# Patient Record
Sex: MALE | URBAN - METROPOLITAN AREA
[De-identification: names, ages, dates, MRNs, and addresses within clinical notes are randomized per-mention and may not be internally consistent; named-entity substitution may affect disease eponyms.]

---

## 2018-10-22 ENCOUNTER — OFFICE VISIT (OUTPATIENT)
Dept: URBAN - METROPOLITAN AREA CLINIC 11 | Facility: CLINIC | Age: 33
End: 2018-10-22
Payer: COMMERCIAL

## 2018-10-22 DIAGNOSIS — H18.212 CORNEAL EDEMA SECONDARY TO CONTACT LENS, LEFT EYE: Primary | ICD-10-CM

## 2018-10-22 PROCEDURE — 92012 INTRM OPH EXAM EST PATIENT: CPT | Performed by: OPTOMETRIST

## 2018-10-22 NOTE — IMPRESSION/PLAN
Impression: Corneal edema secondary to contact lens, left eye: H18.212. Plan: reduce OFlox to 1gt qid os and increase PF to tid os. f/u 1wk.  no CL wear until off drops

## 2019-05-23 ENCOUNTER — TELEPHONE (OUTPATIENT)
Dept: ORTHOPEDICS | Facility: CLINIC | Age: 34
End: 2019-05-23

## 2019-05-23 DIAGNOSIS — M25.552 BILATERAL HIP PAIN: Primary | ICD-10-CM

## 2019-05-23 DIAGNOSIS — M25.551 BILATERAL HIP PAIN: Primary | ICD-10-CM

## 2019-05-23 NOTE — TELEPHONE ENCOUNTER
Returned call to patient. Appointment scheduled with Dr. Rico. Patient voiced understanding of appointment date, time, and location.

## 2019-05-23 NOTE — TELEPHONE ENCOUNTER
----- Message from Shaniqua Johnson sent at 5/23/2019 10:48 AM CDT -----  Type: Needs Medical Advice    Who Called:  Patient   Best Call Back Number: [phone]  Additional Information: contact patient to advise if a referral was received from [facility]

## 2019-05-31 ENCOUNTER — HOSPITAL ENCOUNTER (OUTPATIENT)
Dept: RADIOLOGY | Facility: HOSPITAL | Age: 34
Discharge: HOME | End: 2019-05-31
Attending: ORTHOPAEDIC SURGERY
Payer: OTHER GOVERNMENT

## 2019-05-31 ENCOUNTER — OFFICE VISIT (OUTPATIENT)
Dept: ORTHOPEDICS | Facility: CLINIC | Age: 34
End: 2019-05-31
Payer: OTHER GOVERNMENT

## 2019-05-31 ENCOUNTER — TELEPHONE (OUTPATIENT)
Dept: ORTHOPEDICS | Facility: CLINIC | Age: 34
End: 2019-05-31

## 2019-05-31 VITALS
BODY MASS INDEX: 34.8 KG/M2 | SYSTOLIC BLOOD PRESSURE: 119 MMHG | HEART RATE: 74 BPM | DIASTOLIC BLOOD PRESSURE: 84 MMHG | RESPIRATION RATE: 18 BRPM | WEIGHT: 235 LBS | HEIGHT: 69 IN

## 2019-05-31 DIAGNOSIS — S73.192A TEAR OF LEFT ACETABULAR LABRUM, INITIAL ENCOUNTER: Primary | ICD-10-CM

## 2019-05-31 DIAGNOSIS — M70.61 GREATER TROCHANTERIC BURSITIS OF BOTH HIPS: ICD-10-CM

## 2019-05-31 DIAGNOSIS — S73.191A TEAR OF RIGHT ACETABULAR LABRUM, INITIAL ENCOUNTER: Primary | ICD-10-CM

## 2019-05-31 DIAGNOSIS — M25.552 BILATERAL HIP PAIN: ICD-10-CM

## 2019-05-31 DIAGNOSIS — M70.62 GREATER TROCHANTERIC BURSITIS OF BOTH HIPS: ICD-10-CM

## 2019-05-31 DIAGNOSIS — M47.817 DJD (DEGENERATIVE JOINT DISEASE), LUMBOSACRAL: Primary | ICD-10-CM

## 2019-05-31 DIAGNOSIS — M19.90 ARTHRITIS: ICD-10-CM

## 2019-05-31 DIAGNOSIS — M25.551 BILATERAL HIP PAIN: ICD-10-CM

## 2019-05-31 DIAGNOSIS — M16.11 ARTHRITIS OF RIGHT HIP: ICD-10-CM

## 2019-05-31 DIAGNOSIS — M54.10 RADICULAR PAIN OF LEFT LOWER EXTREMITY: ICD-10-CM

## 2019-05-31 DIAGNOSIS — S73.199A TEAR OF ACETABULAR LABRUM, UNSPECIFIED LATERALITY, INITIAL ENCOUNTER: ICD-10-CM

## 2019-05-31 DIAGNOSIS — M25.552 LEFT HIP PAIN: ICD-10-CM

## 2019-05-31 DIAGNOSIS — M25.551 RIGHT HIP PAIN: ICD-10-CM

## 2019-05-31 PROCEDURE — 99999 PR PBB SHADOW E&M-EST. PATIENT-LVL III: ICD-10-PCS | Mod: PBBFAC,,, | Performed by: ORTHOPAEDIC SURGERY

## 2019-05-31 PROCEDURE — 99213 OFFICE O/P EST LOW 20 MIN: CPT | Mod: PBBFAC,25,PN | Performed by: ORTHOPAEDIC SURGERY

## 2019-05-31 PROCEDURE — 73521 X-RAY EXAM HIPS BI 2 VIEWS: CPT | Mod: TC,PN

## 2019-05-31 PROCEDURE — 99204 OFFICE O/P NEW MOD 45 MIN: CPT | Mod: S$PBB,,, | Performed by: ORTHOPAEDIC SURGERY

## 2019-05-31 PROCEDURE — 73521 XR HIPS BILATERAL 2 VIEW INCL AP PELVIS: ICD-10-PCS | Mod: 26,,, | Performed by: RADIOLOGY

## 2019-05-31 PROCEDURE — 99999 PR PBB SHADOW E&M-EST. PATIENT-LVL III: CPT | Mod: PBBFAC,,, | Performed by: ORTHOPAEDIC SURGERY

## 2019-05-31 PROCEDURE — 73521 X-RAY EXAM HIPS BI 2 VIEWS: CPT | Mod: 26,,, | Performed by: RADIOLOGY

## 2019-05-31 PROCEDURE — 99204 PR OFFICE/OUTPT VISIT, NEW, LEVL IV, 45-59 MIN: ICD-10-PCS | Mod: S$PBB,,, | Performed by: ORTHOPAEDIC SURGERY

## 2019-05-31 RX ORDER — TIZANIDINE 4 MG/1
4 TABLET ORAL EVERY 6 HOURS PRN
COMMUNITY

## 2019-05-31 RX ORDER — CHOLECALCIFEROL (VITAMIN D3) 25 MCG
50000 TABLET ORAL
COMMUNITY

## 2019-05-31 RX ORDER — DULOXETIN HYDROCHLORIDE 60 MG/1
60 CAPSULE, DELAYED RELEASE ORAL 2 TIMES DAILY
COMMUNITY

## 2019-05-31 RX ORDER — BUSPIRONE HYDROCHLORIDE 15 MG/1
15 TABLET ORAL 2 TIMES DAILY
COMMUNITY
End: 2020-04-08

## 2019-05-31 RX ORDER — GABAPENTIN 100 MG/1
100 CAPSULE ORAL 2 TIMES DAILY
COMMUNITY
End: 2020-07-30

## 2019-05-31 RX ORDER — IBUPROFEN 800 MG/1
800 TABLET ORAL EVERY 6 HOURS PRN
COMMUNITY
End: 2020-12-01 | Stop reason: ALTCHOICE

## 2019-05-31 RX ORDER — DOXEPIN HYDROCHLORIDE 10 MG/1
10 CAPSULE ORAL NIGHTLY
COMMUNITY
End: 2020-07-30

## 2019-05-31 RX ORDER — HYDROCORTISONE 25 MG/G
CREAM TOPICAL 2 TIMES DAILY
COMMUNITY

## 2019-05-31 RX ORDER — OMEPRAZOLE 20 MG/1
20 CAPSULE, DELAYED RELEASE ORAL DAILY
COMMUNITY
End: 2020-04-08 | Stop reason: SDUPTHER

## 2019-05-31 RX ORDER — POLYETHYLENE GLYCOL 3350 17 G/17G
17 POWDER, FOR SOLUTION ORAL
COMMUNITY
End: 2021-01-08 | Stop reason: SDUPTHER

## 2019-05-31 NOTE — PROGRESS NOTES
Subjective:      Patient ID: Primo Yeh is a 33 y.o. male.    Chief Complaint: Pain and Injury of the Left Hip and Pain and Injury of the Right Hip    Referring Provider: Aaareferral Self  No address on file    HPI:  Mr. Yeh is a 33-year-old gentleman who presented today for evaluation of greater than 5 years of bilateral hip pain.  He stated that his pain began after he did unstable landing while in jumped school with the [employer].  Side to side motion increases his symptoms while walking also increases them. Stretching his hips helps to decrease some of the pain.  His left hip is more painful than his right.  He also has a history of radiculopathy down the left side of his leg. He has taken NSAIDs without help.  He has not done physical therapy nor had injections.    Past Medical History:   Diagnosis Date    Anxiety     Arthritis     Depression      *  *  *  *  *  *  *  * Fractures  Asthma  Seasonal allergies  GERD  IBS  Headaches  Sleep apnea  PTSD  Pain management      Past Surgical History:   Procedure Laterality Date    PERONEAL TENDON REPAIR LEFT ANKLE Left     COLONOSCOPY      EYE SURGERY PRK bilaterally  2013     * WRIST FRACTURE SURGERY ORIF left wrist  EGD Left 2005       Review of patient's allergies indicates:  No Known Allergies    Social History     Occupational History         Tobacco Use    Smoking status: Former Smoker   Substance and Sexual Activity    Alcohol use: Yes    Drug use: Never    Sexual activity: Not on file      Family History   Problem Relation Age of Onset    Diabetes Mother     Cancer Father     Lupus Brother     Hypertension Brother     Asthma Son     Sleep apnea Son     Allergies Son     Allergies Daughter     Allergies Daughter        Previous Hospitalizations:  Denied previous hospitalizations.    ROS:   Review of Systems   Constitution: Negative for chills and fever.   HENT: Negative for congestion.    Eyes: Negative for blurred vision.    Cardiovascular: Negative for chest pain.   Respiratory: Negative for cough.    Endocrine: Negative for polydipsia.   Hematologic/Lymphatic: Negative for adenopathy.   Skin: Negative for itching and rash.   Musculoskeletal: Positive for joint pain. Negative for gout.   Gastrointestinal: Positive for heartburn. Negative for constipation and diarrhea.   Genitourinary: Negative for nocturia.   Neurological: Positive for headaches. Negative for seizures.   Psychiatric/Behavioral: Positive for depression. Negative for substance abuse. The patient is nervous/anxious.    Allergic/Immunologic: Positive for environmental allergies.           Objective:      Physical Exam:   General: AAOx3.  No acute distress  HEENT: Normocephalic, PEARLA EOMI, Good Dentition  Neck: Supple, No JVD  Chest: Symetric, equal excursion on inspiration  Abdomen: Soft NTND  Vascular:  Pulses intact and equal bilaterally.  Capillary refill less than 3 seconds and equal bilaterally  Neurologic:  Pinprick and soft touch intact and equal bilaterally .  Mildly positive SLR left lower extremity  Integment:  No ecchymosis, no errythema  Extremity:  Hip:  Flexion/extension equal bilaterally greater than 95/15 degrees. Internal/external rotation equal bilaterally.  Gilberto/fabere/logroll/push-pull relatively negative both hips.  Positive CAM test both hips.  Tender over both greater trochanters. Charly's positive bilaterally. Mild tenderness with groin palpation.                      Lumbosacral spine:  Forward flexion/backward flexion 65/15 degrees. Right/left rotation 45/45 degrees. Right/left side bending 25/25 degrees. Nontender with palpation lumbosacral spine. Relatively nontender with motion lumbosacral spine.  Radiography:  Personally reviewed x-rays which include AP pelvis and bilateral hips completed on 05/31/2019 showed moderate osteoarthritic changes of both hips with the right greater than left with the right with early osteophytes.  There is also  an osteophyte at off the left greater trochanter. Lumbosacral degenerative changes are also seen.      Assessment:       Impression:      1. Tear of acetabular labrum, unspecified laterality, initial encounter    2. Arthritis of right hip    3.  4.  5. Greater trochanteric bursitis of both hips   Lumbosacral arthritis.  Mild radicular left lower extremity pain         Plan:       1.  Discussed physical examination and radiographic findings with the patient. Primo understands that he has what appears to be a labral tear and some mild arthritis of his hips.  This is exacerbated by some mild radicular issues.  He understands in order to fully evaluate his hips he will need an MRI.  2.  Refer for an MRI of both hips.  3.  Final recommendations after MRI is completed.  4.  Take NSAIDs as tolerated allowed by PCM.  5.  Home exercises to include hip stretching exercises were discussed.  6.  Offered a steroid injection or to the greater trochanters of both hips he declined.  7.  [facility] portal was discussed with the patient and information was given.  The patient was encouraged to use the portal for future encounters.  8.  Follow up after MRI is completed.  Discussed with the patient that if the MRIs show a labral tear he will need to be referred to an orthopedic surgeon who performs hip arthroscopy surgery.

## 2019-06-11 ENCOUNTER — HOSPITAL ENCOUNTER (OUTPATIENT)
Dept: RADIOLOGY | Facility: HOSPITAL | Age: 34
Discharge: HOME | End: 2019-06-11
Attending: ORTHOPAEDIC SURGERY
Payer: OTHER GOVERNMENT

## 2019-06-11 DIAGNOSIS — M19.90 ARTHRITIS: ICD-10-CM

## 2019-06-11 DIAGNOSIS — M25.552 LEFT HIP PAIN: ICD-10-CM

## 2019-06-11 DIAGNOSIS — S73.192A TEAR OF LEFT ACETABULAR LABRUM, INITIAL ENCOUNTER: ICD-10-CM

## 2019-06-11 DIAGNOSIS — S73.191A TEAR OF RIGHT ACETABULAR LABRUM, INITIAL ENCOUNTER: ICD-10-CM

## 2019-06-11 DIAGNOSIS — M25.551 RIGHT HIP PAIN: ICD-10-CM

## 2019-06-11 PROCEDURE — 73721 MRI HIP WITHOUT CONTRAST RIGHT: ICD-10-PCS | Mod: 26,RT,, | Performed by: RADIOLOGY

## 2019-06-11 PROCEDURE — 73721 MRI JNT OF LWR EXTRE W/O DYE: CPT | Mod: 26,RT,, | Performed by: RADIOLOGY

## 2019-06-11 PROCEDURE — 73721 MRI JNT OF LWR EXTRE W/O DYE: CPT | Mod: TC,LT

## 2019-06-11 PROCEDURE — 73721 MRI HIP WITHOUT CONTRAST LEFT: ICD-10-PCS | Mod: 26,LT,, | Performed by: RADIOLOGY

## 2019-06-11 PROCEDURE — 73721 MRI JNT OF LWR EXTRE W/O DYE: CPT | Mod: TC,RT

## 2019-06-11 PROCEDURE — 73721 MRI JNT OF LWR EXTRE W/O DYE: CPT | Mod: 26,LT,, | Performed by: RADIOLOGY

## 2019-07-15 ENCOUNTER — TELEPHONE (OUTPATIENT)
Dept: ORTHOPEDICS | Facility: CLINIC | Age: 34
End: 2019-07-15

## 2019-07-15 NOTE — TELEPHONE ENCOUNTER
Returned the patient's call. The patient was calling to schedule a follow up appointment to review his MRI results of his hips. Patient is scheduled for this Wednesday, 7-, in [place]. Patient verbalized date, time, and location of appointment.

## 2019-07-15 NOTE — TELEPHONE ENCOUNTER
----- Message from RT Shant sent at 7/15/2019 10:29 AM CDT -----  Contact: pt    pt , requesting a call back soon, seeking medical advise, thanks.

## 2019-07-17 ENCOUNTER — OFFICE VISIT (OUTPATIENT)
Dept: ORTHOPEDICS | Facility: CLINIC | Age: 34
End: 2019-07-17
Payer: OTHER GOVERNMENT

## 2019-07-17 VITALS
WEIGHT: 235 LBS | SYSTOLIC BLOOD PRESSURE: 123 MMHG | HEIGHT: 69 IN | HEART RATE: 78 BPM | DIASTOLIC BLOOD PRESSURE: 85 MMHG | BODY MASS INDEX: 34.8 KG/M2

## 2019-07-17 DIAGNOSIS — S73.199D TEAR OF ACETABULAR LABRUM, UNSPECIFIED LATERALITY, SUBSEQUENT ENCOUNTER: Primary | ICD-10-CM

## 2019-07-17 DIAGNOSIS — M16.0 PRIMARY OSTEOARTHRITIS OF BOTH HIPS: ICD-10-CM

## 2019-07-17 PROCEDURE — 99213 OFFICE O/P EST LOW 20 MIN: CPT | Mod: S$PBB,,, | Performed by: ORTHOPAEDIC SURGERY

## 2019-07-17 PROCEDURE — 99213 OFFICE O/P EST LOW 20 MIN: CPT | Mod: PBBFAC,PN | Performed by: ORTHOPAEDIC SURGERY

## 2019-07-17 PROCEDURE — 99999 PR PBB SHADOW E&M-EST. PATIENT-LVL III: CPT | Mod: PBBFAC,,, | Performed by: ORTHOPAEDIC SURGERY

## 2019-07-17 PROCEDURE — 99999 PR PBB SHADOW E&M-EST. PATIENT-LVL III: ICD-10-PCS | Mod: PBBFAC,,, | Performed by: ORTHOPAEDIC SURGERY

## 2019-07-17 PROCEDURE — 99213 PR OFFICE/OUTPT VISIT, EST, LEVL III, 20-29 MIN: ICD-10-PCS | Mod: S$PBB,,, | Performed by: ORTHOPAEDIC SURGERY

## 2019-07-17 NOTE — PROGRESS NOTES
Subjective:      Patient ID: Primo Yeh is a 33 y.o. male.    Chief Complaint: Pain of the Left Hip; Pain of the Right Hip; and Results (MRI Bilateral Hips)      HPI: Mr. Yeh returns today to review the results of an MRI of his hips.  He was last seen on 05/31/2019 for 5 years of bilateral hip pain with the left worse than the right which began after he landed funny during a pair shoe jump.  He stated his left leg is still more symptomatic than his right.    ROS:  No new diagnosis/surgery/prescriptions since last office visit on 05/31/2019.  Constitution: Negative for chills and fever.   HENT: Negative for congestion.    Eyes: Negative for blurred vision.   Cardiovascular: Negative for chest pain.   Respiratory: Negative for cough.    Endocrine: Negative for polydipsia.   Hematologic/Lymphatic: Negative for adenopathy.   Skin: Negative for itching and rash.   Musculoskeletal: Positive for joint pain. Negative for gout.   Gastrointestinal: Positive for heartburn. Negative for constipation and diarrhea.   Genitourinary: Negative for nocturia.   Neurological: Positive for headaches. Negative for seizures.   Psychiatric/Behavioral: Positive for depression. Negative for substance abuse. The patient is nervous/anxious.    Allergic/Immunologic: Positive for environmental allergies.       Objective:      Physical Exam:   General: AAOx3.  No acute distress  Vascular:  Pulses intact and equal bilaterally.  Capillary refill less than 3 seconds and equal bilaterally  Neurologic:  Pinprick and soft touch intact and equal bilaterally.  Mildl positive SLR  Integment:  No ecchymosis, no errythema  Extremity:  Hip:  Flexion/extension equal bilaterally greater than 95/15 degrees. Internal/external rotation equal bilaterally.  Gilberto/fabere/logroll/push-pull relatively negative both hips.  Positive CAM test both hips.  Tender over both greater trochanters. Charly's positive bilaterally. Mild tenderness with groin  palpation.  Radiography:  Personally reviewed MRI of both hips completed on 06/11/2019 which showed femoral acetabular early arthritis of both hips and a superior labral tear of his right hip with the subtle labral changes in his left hip. He also has pubic symphysis arthritis.      Assessment:       Impression:   1.  Labral tear, right hip.  2.  Labral tear, left hip.  3.  Mild femoral acetabular arthritis, both hips.      Plan:       1.  Discussed physical examination and radiographic findings with the patient. Primo understands that he has labral tears of his hips and unfortunately this office does not perform hip arthroscopy.  He will need to be seen by hip arthroscopy past.  2.  Refer to orthopedic hip arthroscopy.  3.  Any pain can be treated with over-the-counter medications dosed per box instructions.  4.  Continue with hip exercises which were shown at last visit.  5.  [facility] portal was discussed with the patient and information was given.  The patient was encouraged to use the portal for future encounters.

## 2019-07-23 ENCOUNTER — TELEPHONE (OUTPATIENT)
Dept: ORTHOPEDICS | Facility: CLINIC | Age: 34
End: 2019-07-23

## 2019-07-23 NOTE — TELEPHONE ENCOUNTER
Returned the patient's call concerning the medical documents. Patient was requesting his office visit notes be sent to his primary care physician. I advised that he would need to fill out a release of information paper with his signature before those notes could be released. The patient understood and stated he would fill out the paper and have it put in his chart.   Both parties advised understanding.

## 2019-07-23 NOTE — TELEPHONE ENCOUNTER
----- Message from Zee Vaughn sent at 7/23/2019  3:26 PM CDT -----  Type: Needs Medical Advice    Who Called:  Patient  Best Call Back Number: [phone] (home)     Additional Information: Would like to speak to office concerning his medical documents. Please call for more details.

## 2019-08-21 ENCOUNTER — TELEPHONE (OUTPATIENT)
Dept: ORTHOPEDICS | Facility: CLINIC | Age: 34
End: 2019-08-21

## 2019-08-21 NOTE — TELEPHONE ENCOUNTER
----- Message from Carlos Patel sent at 8/21/2019  9:38 AM CDT -----  Contact: pt  Needs info regarding appointment, [phone]

## 2020-04-07 ENCOUNTER — TELEPHONE (OUTPATIENT)
Dept: GASTROENTEROLOGY | Facility: CLINIC | Age: 35
End: 2020-04-07

## 2020-04-07 NOTE — TELEPHONE ENCOUNTER
----- Message from Tanmay Dhaliwal sent at 4/7/2020 11:56 AM CDT -----  Type:  Patient Returning Call    Who Called:  Patient  Who Left Message for Patient:  Farheen  Does the patient know what this is regarding?:    Best Call Back Number:  [phone]  Additional Information:

## 2020-04-07 NOTE — TELEPHONE ENCOUNTER
----- Message from Eliza Bauer sent at 4/7/2020 11:45 AM CDT -----  Contact: Patient  Type: Needs Medical Advice    Who Called:  Patient  Best Call Back Number: [phone] (home)   Additional Information: The pt said he is having abd pain from IBS and feels he needs to be seen asap please call to get him an appt patient said he is open to a Virtual Video appt

## 2020-04-08 ENCOUNTER — OFFICE VISIT (OUTPATIENT)
Dept: GASTROENTEROLOGY | Facility: CLINIC | Age: 35
End: 2020-04-08
Payer: OTHER GOVERNMENT

## 2020-04-08 ENCOUNTER — HOSPITAL ENCOUNTER (OUTPATIENT)
Dept: RADIOLOGY | Facility: HOSPITAL | Age: 35
Discharge: HOME | End: 2020-04-08
Attending: INTERNAL MEDICINE
Payer: OTHER GOVERNMENT

## 2020-04-08 VITALS
HEIGHT: 69 IN | RESPIRATION RATE: 18 BRPM | WEIGHT: 240 LBS | SYSTOLIC BLOOD PRESSURE: 120 MMHG | OXYGEN SATURATION: 97 % | DIASTOLIC BLOOD PRESSURE: 84 MMHG | HEART RATE: 80 BPM | BODY MASS INDEX: 35.55 KG/M2

## 2020-04-08 DIAGNOSIS — K58.9 IRRITABLE BOWEL SYNDROME, UNSPECIFIED TYPE: Primary | ICD-10-CM

## 2020-04-08 DIAGNOSIS — R19.7 DIARRHEA, UNSPECIFIED TYPE: ICD-10-CM

## 2020-04-08 DIAGNOSIS — K58.9 IRRITABLE BOWEL SYNDROME, UNSPECIFIED TYPE: ICD-10-CM

## 2020-04-08 DIAGNOSIS — K64.9 HEMORRHOIDS, UNSPECIFIED HEMORRHOID TYPE: ICD-10-CM

## 2020-04-08 DIAGNOSIS — K21.9 GASTROESOPHAGEAL REFLUX DISEASE, ESOPHAGITIS PRESENCE NOT SPECIFIED: ICD-10-CM

## 2020-04-08 PROCEDURE — 76700 US EXAM ABDOM COMPLETE: CPT | Mod: 26,,, | Performed by: RADIOLOGY

## 2020-04-08 PROCEDURE — 76700 US ABDOMEN COMPLETE: ICD-10-PCS | Mod: 26,,, | Performed by: RADIOLOGY

## 2020-04-08 PROCEDURE — 99204 OFFICE O/P NEW MOD 45 MIN: CPT | Mod: S$PBB,,, | Performed by: INTERNAL MEDICINE

## 2020-04-08 PROCEDURE — 99204 PR OFFICE/OUTPT VISIT, NEW, LEVL IV, 45-59 MIN: ICD-10-PCS | Mod: S$PBB,,, | Performed by: INTERNAL MEDICINE

## 2020-04-08 PROCEDURE — 99999 PR PBB SHADOW E&M-EST. PATIENT-LVL III: CPT | Mod: PBBFAC,,, | Performed by: INTERNAL MEDICINE

## 2020-04-08 PROCEDURE — 99999 PR PBB SHADOW E&M-EST. PATIENT-LVL III: ICD-10-PCS | Mod: PBBFAC,,, | Performed by: INTERNAL MEDICINE

## 2020-04-08 PROCEDURE — 76700 US EXAM ABDOM COMPLETE: CPT | Mod: TC,PN

## 2020-04-08 PROCEDURE — 99213 OFFICE O/P EST LOW 20 MIN: CPT | Mod: PBBFAC,PN,25 | Performed by: INTERNAL MEDICINE

## 2020-04-08 RX ORDER — QUETIAPINE FUMARATE 50 MG/1
TABLET, FILM COATED ORAL
COMMUNITY
Start: 2020-01-21

## 2020-04-08 RX ORDER — AMITRIPTYLINE HYDROCHLORIDE 10 MG/1
TABLET, FILM COATED ORAL
COMMUNITY
Start: 2020-04-03 | End: 2020-07-30

## 2020-04-08 RX ORDER — DICYCLOMINE HYDROCHLORIDE 20 MG/1
20 TABLET ORAL
Qty: 360 TABLET | Refills: 1 | Status: SHIPPED | OUTPATIENT
Start: 2020-04-08 | End: 2020-10-05

## 2020-04-08 RX ORDER — OMEPRAZOLE 20 MG/1
20 CAPSULE, DELAYED RELEASE ORAL DAILY
Qty: 90 CAPSULE | Refills: 1 | Status: SHIPPED | OUTPATIENT
Start: 2020-04-08 | End: 2020-04-29

## 2020-04-08 NOTE — PATIENT INSTRUCTIONS
He will continue ibuprofen but take his omeprazole daily basis.  He started on the dicyclomine for the spasm.  He will make a food diary and avoid the off ending foods.  He had more fiber to the diet.  Stool studies labs and ultrasound be scheduled.  I have requested his previous records.

## 2020-04-08 NOTE — PROGRESS NOTES
"Subjective:       Patient ID: Primo Yeh is a 34 y.o. male.    Chief Complaint: Irritable Bowel Syndrome    He complains of abdominal pain.  He states from my knees to my chest I have chronic pain ".  He states in 2007 while in [employer] he experienced abdominal pain in the left lower quadrant associated bowel irregularity he has been having diarrhea constipation.  He describes upper endoscopy and told he had a hiatal hernia gastroesophageal reflux and he has been on omeprazole intermittently since then.  He was told that he had an irritable bowel.  He had a colonoscopy because his father had colon cancer at age 47.  He was told he had internal hemorrhoids.  He has episodes of severe diarrhea.  He was tried on multiple medications including the vibrizi which helped his symptoms although it aggravated his migraine headaches would was stopped.  He cannot pinpoint a a precipitating factor for his diarrhea.  He has 2-3 days of diarrhea and then experiences constipation.  Currently he is on multiple OTC remedies for the hemorrhoids.  He complains of pain rather than hematochezia.  He denies hematemesis hematochezia jaundice dysphagia or aspiration.  He denies fever chills.  His weight has remained stable.  He has tried the hydrocortisone suppositories which has helped him.  At one  time he was taking 4-5 is suppositories per day.  He takes frequent showers because of the diarrhea and perianal contamination due to the liquid bowel movements.  His family history is negative for GI disease except the father had colon cancer.  [facility] he states was normal.  He also was evaluated at [facility].  He states the pain has been chronic.  He states that he was a [employer] tro[employer] and after a jump landed on his tailbone.  He believes this is the precipitating factor for his symptoms.  He has chronic back and hip and joint pain.  He has been followed by the orthopedist.  He states the " NIKI lucero row friend has helped the symptoms.  He denies swollen joints.  He states [facility] is disabled [facility].  He has not worked in 2 years.      Allergies:  Review of patient's allergies indicates:  No Known Allergies    Medications:    Current Outpatient Medications:     amitriptyline (ELAVIL) 10 MG tablet, , Disp: , Rfl:     doxepin (SINEQUAN) 10 MG capsule, Take 10 mg by mouth every evening., Disp: , Rfl:     DULoxetine (CYMBALTA) 60 MG capsule, Take 60 mg by mouth 2 (two) times daily., Disp: , Rfl:     gabapentin (NEURONTIN) 100 MG capsule, Take 100 mg by mouth 2 (two) times daily., Disp: , Rfl:     hydrocortisone (ANUSOL-HC) 2.5 % rectal cream, Place rectally 2 (two) times daily., Disp: , Rfl:     hydrocortisone (PROCTOCORT) 10 % (80 mg) rectal foam, Place 1 applicator rectally 2 (two) times daily., Disp: , Rfl:     hydrocortisone-pramoxine (PROCTOFOAM-HS) rectal foam, hydrocortisone 1 %-pramoxine 1 % rectal foam  Insert by rectal route., Disp: , Rfl:     ibuprofen (ADVIL,MOTRIN) 800 MG tablet, Take 800 mg by mouth every 6 (six) hours as needed for Pain., Disp: , Rfl:     omeprazole (PRILOSEC) 20 MG capsule, Take 1 capsule (20 mg total) by mouth once daily., Disp: 90 capsule, Rfl: 1    polycarbophil (FIBERCON) 625 mg tablet, Take 625 mg by mouth once daily., Disp: , Rfl:     polyethylene glycol (MIRALAX) 17 gram/dose powder, Take 17 g by mouth as needed., Disp: , Rfl:     QUEtiapine (SEROQUEL) 50 MG tablet, , Disp: , Rfl:     tiZANidine (ZANAFLEX) 4 MG tablet, Take 4 mg by mouth every 6 (six) hours as needed., Disp: , Rfl:     vitamin D (VITAMIN D3) 1000 units Tab, Take 1,000 Units by mouth 2 (two) times daily., Disp: , Rfl:     dicyclomine (BENTYL) 20 mg tablet, Take 1 tablet (20 mg total) by mouth 4 (four) times daily before meals and nightly., Disp: 360 tablet, Rfl: 1    Past Medical History:   Diagnosis Date    Anxiety     Arthritis     Depression     Fractures        Past Surgical  History:   Procedure Laterality Date    ANKLE FRACTURE SURGERY Left     COLONOSCOPY      EYE SURGERY  2013    WRIST FRACTURE SURGERY Left 2005         Review of Systems   Gastrointestinal: Positive for abdominal pain, constipation, diarrhea and nausea. Negative for abdominal distention, anal bleeding, blood in stool, rectal pain and vomiting.        He has been tried on multiple medications including fiber supplements which did not help him.  He has been tried on the busp(ar  and similar agents which has not helped his anxiety or his pain complex.  He mainly has diarrhea with left lower quadrant discomfort.  The cramping left lower quadrant signals the diarrhea.  It is not associated with incontinence and is not nocturnal.  He cannot pinpoint a specific food her factor which causes the bowel irregularity.  Is not related to physical activity.   Musculoskeletal: Positive for arthralgias and back pain.   Neurological:        He states his chronic migraine headaches although he has not had headache recently.       Objective:      Physical Exam   Constitutional: He is oriented to person, place, and time. He appears well-developed and well-nourished.   Well-nourished well-hydrated overweight nonicteric  male.  He is oriented x3.  He can relate his history and answer questions appropriately.  The patient in the exam in physician both will ran mask and gloves.   HENT:   Head: Normocephalic.   Eyes: Pupils are equal, round, and reactive to light. EOM are normal.   Neck: Normal range of motion. Neck supple. No tracheal deviation present. No thyromegaly present.   Cardiovascular: Normal rate, regular rhythm and normal heart sounds.   Pulmonary/Chest: Effort normal and breath sounds normal.   Abdominal: Soft. Bowel sounds are normal. He exhibits no distension and no mass. There is tenderness. There is no rebound and no guarding. A hernia is present.   The abdomen is soft and there is a small umbilical  hernia which is reducible.  It is obese with minimal tenderness in left lower quadrant.  Organomegaly masses are absent.  Bowel sounds are normal.   Musculoskeletal: Normal range of motion.   He can ambulate normally.  He can go from the sitting to the standing position without difficulty.  He can get on the exam table without difficulty or assistance.   Lymphadenopathy:     He has no cervical adenopathy.   Neurological: He is alert and oriented to person, place, and time. No cranial nerve deficit.   Skin: Skin is warm and dry.   Psychiatric: He has a normal mood and affect. His behavior is normal.   Vitals reviewed.        Plan:       Irritable bowel syndrome, unspecified type  -     Calprotectin; Future; Expected date: 04/08/2020  -     H. pylori antigen, stool; Future; Expected date: 04/08/2020  -     C-reactive protein; Future; Expected date: 04/08/2020  -     Gastrointestinal Pathogens Panel, PCR; Future; Expected date: 04/08/2020  -     CBC auto differential; Future; Expected date: 04/08/2020  -     Comprehensive metabolic panel; Future; Expected date: 04/08/2020  -     Strongyloides IgG Antibodies; Future; Expected date: 04/08/2020  -     US Abdomen Complete; Future; Expected date: 04/08/2020  -     dicyclomine (BENTYL) 20 mg tablet; Take 1 tablet (20 mg total) by mouth 4 (four) times daily before meals and nightly.  Dispense: 360 tablet; Refill: 1    Diarrhea, unspecified type  -     Calprotectin; Future; Expected date: 04/08/2020  -     H. pylori antigen, stool; Future; Expected date: 04/08/2020  -     C-reactive protein; Future; Expected date: 04/08/2020  -     Gastrointestinal Pathogens Panel, PCR; Future; Expected date: 04/08/2020  -     CBC auto differential; Future; Expected date: 04/08/2020  -     Comprehensive metabolic panel; Future; Expected date: 04/08/2020  -     Strongyloides IgG Antibodies; Future; Expected date: 04/08/2020  -     US Abdomen Complete; Future; Expected date:  04/08/2020    Gastroesophageal reflux disease, esophagitis presence not specified  -     omeprazole (PRILOSEC) 20 MG capsule; Take 1 capsule (20 mg total) by mouth once daily.  Dispense: 90 capsule; Refill: 1    Hemorrhoids, unspecified hemorrhoid type     He will continue his reflux regimen.  He will take his omeprazole daily.  He continue the ibuprofen and current medications.  He will make a food diary and avoid the offending foods.  I have requested the previous records.  Laboratory evaluation is in progress.

## 2020-04-20 ENCOUNTER — LAB VISIT (OUTPATIENT)
Dept: LAB | Facility: HOSPITAL | Age: 35
End: 2020-04-20
Attending: INTERNAL MEDICINE
Payer: OTHER GOVERNMENT

## 2020-04-20 DIAGNOSIS — R19.7 DIARRHEA, UNSPECIFIED TYPE: ICD-10-CM

## 2020-04-20 DIAGNOSIS — K58.9 IRRITABLE BOWEL SYNDROME, UNSPECIFIED TYPE: ICD-10-CM

## 2020-04-20 PROCEDURE — 87338 HPYLORI STOOL AG IA: CPT

## 2020-04-20 PROCEDURE — 87507 IADNA-DNA/RNA PROBE TQ 12-25: CPT

## 2020-04-20 PROCEDURE — 83993 ASSAY FOR CALPROTECTIN FECAL: CPT

## 2020-04-23 LAB
CALPROTECTIN STL-MCNT: <27.1 MCG/G
GPP - ADENOVIRUS 40/41: NOT DETECTED
GPP - CAMPYLOBACTER: NOT DETECTED
GPP - CLOSTRIDIUM DIFFICILE TOXIN A/B: NOT DETECTED
GPP - CRYPTOSPORIDIUM: NOT DETECTED
GPP - E COLI O157: NOT DETECTED
GPP - ENTAMOEBA HISTOLYTICA: NOT DETECTED
GPP - ENTEROTOXIGENIC E COLI (ETEC): NOT DETECTED
GPP - GIARDIA LAMBLIA: NOT DETECTED
GPP - NOROVIRUS GI/GII: NOT DETECTED
GPP - ROTAVIRUS A: NOT DETECTED
GPP - SALMONELLA: NOT DETECTED
GPP - SHIGELLA: NOT DETECTED
GPP - VIBRIO CHOLERA: NOT DETECTED
GPP - YERSINIA ENTEROCOLITICA: NOT DETECTED
LACTATE PLASV-SCNC: NOT DETECTED MMOL/L

## 2020-04-25 LAB — H PYLORI AG STL QL IA: NOT DETECTED

## 2020-04-29 ENCOUNTER — OFFICE VISIT (OUTPATIENT)
Dept: GASTROENTEROLOGY | Facility: CLINIC | Age: 35
End: 2020-04-29
Payer: OTHER GOVERNMENT

## 2020-04-29 VITALS
HEIGHT: 69 IN | TEMPERATURE: 98 F | DIASTOLIC BLOOD PRESSURE: 78 MMHG | HEART RATE: 83 BPM | RESPIRATION RATE: 16 BRPM | OXYGEN SATURATION: 97 % | SYSTOLIC BLOOD PRESSURE: 114 MMHG | WEIGHT: 239 LBS | BODY MASS INDEX: 35.4 KG/M2

## 2020-04-29 DIAGNOSIS — K21.9 GASTROESOPHAGEAL REFLUX DISEASE, ESOPHAGITIS PRESENCE NOT SPECIFIED: ICD-10-CM

## 2020-04-29 DIAGNOSIS — K58.9 IRRITABLE BOWEL SYNDROME, UNSPECIFIED TYPE: ICD-10-CM

## 2020-04-29 DIAGNOSIS — K62.89 ANAL OR RECTAL PAIN: ICD-10-CM

## 2020-04-29 DIAGNOSIS — K64.9 HEMORRHOIDS, UNSPECIFIED HEMORRHOID TYPE: Primary | ICD-10-CM

## 2020-04-29 DIAGNOSIS — K44.9 HIATAL HERNIA: ICD-10-CM

## 2020-04-29 DIAGNOSIS — K42.9 UMBILICAL HERNIA WITHOUT OBSTRUCTION AND WITHOUT GANGRENE: ICD-10-CM

## 2020-04-29 PROCEDURE — 99999 PR PBB SHADOW E&M-EST. PATIENT-LVL IV: CPT | Mod: PBBFAC,,, | Performed by: INTERNAL MEDICINE

## 2020-04-29 PROCEDURE — 99213 OFFICE O/P EST LOW 20 MIN: CPT | Mod: S$PBB,,, | Performed by: INTERNAL MEDICINE

## 2020-04-29 PROCEDURE — 99213 PR OFFICE/OUTPT VISIT, EST, LEVL III, 20-29 MIN: ICD-10-PCS | Mod: S$PBB,,, | Performed by: INTERNAL MEDICINE

## 2020-04-29 PROCEDURE — 99214 OFFICE O/P EST MOD 30 MIN: CPT | Mod: PBBFAC,PN | Performed by: INTERNAL MEDICINE

## 2020-04-29 PROCEDURE — 99999 PR PBB SHADOW E&M-EST. PATIENT-LVL IV: ICD-10-PCS | Mod: PBBFAC,,, | Performed by: INTERNAL MEDICINE

## 2020-04-29 RX ORDER — PANTOPRAZOLE SODIUM 40 MG/1
40 TABLET, DELAYED RELEASE ORAL DAILY
Qty: 90 TABLET | Refills: 3 | Status: SHIPPED | OUTPATIENT
Start: 2020-04-29 | End: 2020-06-24 | Stop reason: SDUPTHER

## 2020-04-29 NOTE — LETTER
April 29, 2020      Binh Mondragon, DO  [address] [facility] [place] [address]           [facility]  [address], [address]  [place] [address]  Phone: [phone]  Fax: [phone]          Patient: Primo Yeh   MR Number: [id number]   YOB: 1985   Date of Visit: 4/29/2020       Dear Dr. Binh Mondragon:    Thank you for referring Primo Yeh to me for evaluation. Attached you will find relevant portions of my assessment and plan of care.    If you have questions, please do not hesitate to call me. I look forward to following Primo Yeh along with you.    Sincerely,    Hussein Wagoner MD    Enclosure  CC:  No Recipients    If you would like to receive this communication electronically, please contact [email or web address] or [phone] to request more information on [employer] access.    For providers and/or their staff who would like to refer a patient to [facility], please contact us through our one-stop-shop provider referral line, [facility] , at [phone].    If you feel you have received this communication in error or would no longer like to receive these types of communications, please e-mail [email or web address]

## 2020-04-29 NOTE — PROGRESS NOTES
Subjective:       Patient ID: Pirmo Yeh is a 34 y.o. male.    Chief Complaint: Follow-up    The ultrasound revealed an umbilical hernia.  The labs and stool studies were all normal.  The [facility] records were reviewed.  They reviewed the previous upper endoscopy which revealed a hiatal hernia and mild gastritis.  Colonoscopy they stated revealed only hemorrhoids.  It was felt as if he had an irritable bowel.  In the interval he states that he is having more constipation but is on the fiber.  He mainly complains of the anal discomfort with bowel movements.  It is burning in nature.  He denies hematemesis hematochezia jaundice or bleeding.  The omeprazole has not controlled the pyrosis.  He is having breakthrough symptoms.  He does not relate his symptoms to a specific activity or food.  He denies dysphagia aspiration.  His weight has remained stable.  [facility]  He believes that he was injured while in the  and he states that his rectal pain radiates into his buttocks and his legs although his symptoms are not associated with activity but generally associated with anal discomfort.  In general he feels as if he has chronic anal pain aggravated with bowel movements.  It does not matter whether he has a formed or semi solid bowel movement.  He does not straining at stool.  He does complain of occasional anal burning.  His colonoscopy was consistent with hemorrhoids and an anal fissure was not demonstrated.  His weight remains stable.  He has made a food diary and avoid the offending foods.  He is physically active.  The hydrocodone creams and hemorrhoids have not resolved the problem.      Allergies:  Review of patient's allergies indicates:  No Known Allergies    Medications:    Current Outpatient Medications:     amitriptyline (ELAVIL) 10 MG tablet, , Disp: , Rfl:     dicyclomine (BENTYL) 20 mg tablet, Take 1 tablet (20 mg total) by mouth 4 (four) times daily before meals and nightly.,  Disp: 360 tablet, Rfl: 1    doxepin (SINEQUAN) 10 MG capsule, Take 10 mg by mouth every evening., Disp: , Rfl:     DULoxetine (CYMBALTA) 60 MG capsule, Take 60 mg by mouth 2 (two) times daily., Disp: , Rfl:     gabapentin (NEURONTIN) 100 MG capsule, Take 100 mg by mouth 2 (two) times daily., Disp: , Rfl:     hydrocortisone (ANUSOL-HC) 2.5 % rectal cream, Place rectally 2 (two) times daily., Disp: , Rfl:     hydrocortisone (PROCTOCORT) 10 % (80 mg) rectal foam, Place 1 applicator rectally 2 (two) times daily., Disp: , Rfl:     hydrocortisone-pramoxine (PROCTOFOAM-HS) rectal foam, hydrocortisone 1 %-pramoxine 1 % rectal foam  Insert by rectal route., Disp: , Rfl:     ibuprofen (ADVIL,MOTRIN) 800 MG tablet, Take 800 mg by mouth every 6 (six) hours as needed for Pain., Disp: , Rfl:     polycarbophil (FIBERCON) 625 mg tablet, Take 625 mg by mouth once daily., Disp: , Rfl:     polyethylene glycol (MIRALAX) 17 gram/dose powder, Take 17 g by mouth as needed., Disp: , Rfl:     QUEtiapine (SEROQUEL) 50 MG tablet, , Disp: , Rfl:     tiZANidine (ZANAFLEX) 4 MG tablet, Take 4 mg by mouth every 6 (six) hours as needed., Disp: , Rfl:     vitamin D (VITAMIN D3) 1000 units Tab, Take 1,000 Units by mouth 2 (two) times daily., Disp: , Rfl:     pantoprazole (PROTONIX) 40 MG tablet, Take 1 tablet (40 mg total) by mouth once daily., Disp: 90 tablet, Rfl: 3    Past Medical History:   Diagnosis Date    Anxiety     Arthritis     Depression     Fractures        Past Surgical History:   Procedure Laterality Date    ANKLE FRACTURE SURGERY Left     COLONOSCOPY      EYE SURGERY  2013    WRIST FRACTURE SURGERY Left 2005         Review of Systems   Constitutional: Negative for appetite change, fever and unexpected weight change.   HENT: Negative for trouble swallowing.         No jaundice.   Respiratory: Negative for cough, shortness of breath and wheezing.         He is a former cigarette smoker.  He does not use tobacco  alcohol at this time.  He denies aspiration hemoptysis.  He denies chronic cough chronic sputum production or dyspnea on exertion.  His physical activity is limited by his extremity pain syndrome.   Cardiovascular: Negative for chest pain.        He denies exertional chest pain or rhythm disturbance.   Gastrointestinal: Positive for constipation, diarrhea and rectal pain. Negative for abdominal distention, abdominal pain, anal bleeding, blood in stool, nausea and vomiting.   Musculoskeletal: Positive for arthralgias and back pain. Negative for neck pain.        He has chronic ankle lower extremity pain.  He response to the anti-inflammatory agents.  She does not believe that they have side effects her aggravate his gastrointestinal symptoms.  He is on multiple medications for the chronic pain syndrome.  He states that sometimes they have cause constipation.  He is not sure that they have helped his pains.   Skin: Negative for pallor and rash.   Neurological: Negative for dizziness, seizures, syncope, speech difficulty, weakness and numbness.   Hematological: Negative for adenopathy.   Psychiatric/Behavioral: Negative for confusion.       Objective:      Physical Exam   Constitutional: He is oriented to person, place, and time. He appears well-developed and well-nourished.   Well-nourished well-hydrated nonicteric is afebrile  male.  He is oriented x3.  He can relate his history and answer questions appropriately.   HENT:   Head: Normocephalic.   Eyes: Pupils are equal, round, and reactive to light. EOM are normal.   Neck: Normal range of motion. Neck supple. No tracheal deviation present. No thyromegaly present.   Cardiovascular: Normal rate, regular rhythm and normal heart sounds.   Pulmonary/Chest: Effort normal and breath sounds normal.   Abdominal: Soft. Bowel sounds are normal. He exhibits no distension and no mass. There is no tenderness. There is no guarding.   Musculoskeletal: Normal range of  motion.   He can ambulate normally.  He can go from the sitting to the standing position without difficulty.   Lymphadenopathy:     He has no cervical adenopathy.   Neurological: He is alert and oriented to person, place, and time. No cranial nerve deficit.   Skin: Skin is warm and dry.   Psychiatric: He has a normal mood and affect. His behavior is normal.   Vitals reviewed.        Plan:       Hemorrhoids, unspecified hemorrhoid type  -     Ambulatory referral/consult to Colorectal Surgery; Future; Expected date: 05/06/2020    Gastroesophageal reflux disease, esophagitis presence not specified  -     pantoprazole (PROTONIX) 40 MG tablet; Take 1 tablet (40 mg total) by mouth once daily.  Dispense: 90 tablet; Refill: 3    Irritable bowel syndrome, unspecified type    Anal or rectal pain    Hiatal hernia    Umbilical hernia without obstruction and without gangrene     He will continue his reflux regimen.  He started on the Protonix.  He will make a food diary and avoid the offending foods.  He will add more fiber to the diet.  He can continue the suppositories if he thinks that they are beneficial.  He is referred to the rectal surgeon for the chronic pain.  He has hemorrhoids and/or anal fissure.  [facility]  He continues his other medications vitamins and minerals.

## 2020-04-29 NOTE — PATIENT INSTRUCTIONS
He will continue the reflux regimen.  He has a history of hiatal hernia.  He will stop the omeprazole and he is started on the Protonix 40 mg daily.  He will make a food diary and avoid the off ending foods.  He continues the fiber supplements.  Because of the chronic anal pain and hemorrhoids he is referred to the rectal surgeon.  [facility] because his symptoms of rectal pain buttocks pain and leg pain he believes were all associated with his injury while in the .  The ultrasound revealed a umbilical hernia which is not causing difficulty.  The labs and stool studies were all normal.

## 2020-05-19 ENCOUNTER — TELEPHONE (OUTPATIENT)
Dept: SURGERY | Facility: CLINIC | Age: 35
End: 2020-05-19

## 2020-05-19 NOTE — TELEPHONE ENCOUNTER
----- Message from Dagmar Bergeron sent at 5/19/2020 11:05 AM CDT -----  Contact: Primo pt  Type:  Sooner Apoointment Request    Caller is requesting a sooner appointment.  Caller declined first available appointment listed below.  Caller will not accept being placed on the waitlist and is requesting a message be sent to doctor.    Name of Caller:  Primo  When is the first available appointment?  Pt is requesting appt om 05-27-20  Symptoms:  n/a  Best Call Back Number:  [phone]  Additional Information:  Pls call pt to reschedule. He accidentally cancelled appt on 05/27/20

## 2020-06-02 ENCOUNTER — OFFICE VISIT (OUTPATIENT)
Dept: RHEUMATOLOGY | Facility: CLINIC | Age: 35
End: 2020-06-02
Payer: OTHER GOVERNMENT

## 2020-06-02 ENCOUNTER — LAB VISIT (OUTPATIENT)
Dept: LAB | Facility: HOSPITAL | Age: 35
End: 2020-06-02
Attending: INTERNAL MEDICINE
Payer: OTHER GOVERNMENT

## 2020-06-02 VITALS
SYSTOLIC BLOOD PRESSURE: 112 MMHG | BODY MASS INDEX: 34.84 KG/M2 | DIASTOLIC BLOOD PRESSURE: 64 MMHG | WEIGHT: 235.88 LBS | TEMPERATURE: 97 F

## 2020-06-02 DIAGNOSIS — M79.7 FIBROMYALGIA: Primary | ICD-10-CM

## 2020-06-02 DIAGNOSIS — M79.7 FIBROMYALGIA: ICD-10-CM

## 2020-06-02 DIAGNOSIS — M79.18 MYOFASCIAL PAIN SYNDROME: ICD-10-CM

## 2020-06-02 LAB — CK SERPL-CCNC: 267 U/L (ref 20–200)

## 2020-06-02 PROCEDURE — 86038 ANTINUCLEAR ANTIBODIES: CPT

## 2020-06-02 PROCEDURE — 84165 PROTEIN E-PHORESIS SERUM: CPT

## 2020-06-02 PROCEDURE — 99205 OFFICE O/P NEW HI 60 MIN: CPT | Mod: S$GLB,,, | Performed by: INTERNAL MEDICINE

## 2020-06-02 PROCEDURE — 82550 ASSAY OF CK (CPK): CPT

## 2020-06-02 PROCEDURE — 86235 NUCLEAR ANTIGEN ANTIBODY: CPT

## 2020-06-02 PROCEDURE — 99205 PR OFFICE/OUTPT VISIT, NEW, LEVL V, 60-74 MIN: ICD-10-PCS | Mod: S$GLB,,, | Performed by: INTERNAL MEDICINE

## 2020-06-02 PROCEDURE — 36415 COLL VENOUS BLD VENIPUNCTURE: CPT

## 2020-06-02 PROCEDURE — 81374 HLA I TYPING 1 ANTIGEN LR: CPT

## 2020-06-02 PROCEDURE — 86200 CCP ANTIBODY: CPT

## 2020-06-02 NOTE — PROGRESS NOTES
[facility] RHEUMATOLOGY           New patient visit    Notes dictated to M*Modal. Please forgive any unintentional errors.  Subjective:       Patient ID:   NAME: Primo Yeh : 1985     34 y.o. male    Referring Doc: [facility]  Other Physicians:    Chief Complaint:  Joint Pain    HPI:  This patient is referred for the evaluation of joint pain.  The patient states he has had generalized muscle and joint pain for many years, particularly over the last 3 years.  He describes the pain as migratory, generally affecting shoulders, neck, back, and hips.  He has some difficulty characterizing the pain.  He has not noted any red, hot, and/or swollen joints.  He has morning stiffness that lasts more than 4 hr.  He finds that any activity aggravates the pain while being sedentary aggravates the stiffness.  He has not taken any medication that has been significantly helpful. He has been unable to work at any outside employment for more than 3 years.  He is unable to perform chores around the house such as cutting the grass.  He has difficulty with doing his part to care for his 3 children, ages 6 and 4 (twins).  Likewise, he feels he has difficulty fulfilling his role as a .    He has what appears to be a lifetime issue with anxiety.  He states however that this became more acute during his service in [employer].  He denies any particular traumatic incident during that deployment (or, for that matter, at any time in his life, including during childhood).  He went before a [employer] board sometime in 2016 and was placed in a transitional unit until discharge in 2018.  He states he was granted 100% disability based in part on a diagnosis of PTSD. He has not worked since then. He has had counseling and has seen mental health providers. Presently, he has prescriptions for several antidepressants though he does not seem to have a psychiatrist or psychologist responsible for their coordination.  These include  high-dose Cymbalta, low-dose amitriptyline and doxepin, low-dose Neurontin and nightly Seroquel.  He also takes tizanidine and ibuprofen.    His past medical history is essentially negative.  He has had some orthopedic injuries including a fractured coccyx and a fractured left ankle and left wrist.  He states these all remain symptomatic.  He is under the care of Orthopedics and apparently a revision of the ORIF of his left wrist is planned.  He has had chronic reflux for which he takes pantoprazole and has had hemorrhoidal bleeding more than a year ago.  He has also been diagnosed with irritable bowel syndrome and a chronic urethral stricture, apparently not related to any history of STD.    ROS:   GEN:      no fever, night sweats or weight loss  SKIN:     no rashes, erythema, bruising, or swelling, no Raynauds, no photosensitivity  HEENT:  no acute changes in vision, no mouth ulcers, no sicca symptoms, no scalp tenderness, jaw claudication.  CV:        no CP, PND, CAREY or orthopnea, + palpitations  PULM:   no SOB, cough, hemoptysis, sputum or pleuritic pain  GI:          No dysphagia, + GERD, no hematemesis; + abdominal pain, + nausea, no vomiting, no constipation, + diarrhea, + melanotic stools, + BRBPR.  :        no hematuria, + dysuria   NEURO: no paresthesias, headaches, visual disturbances  MUSCULOSKELETAL:  No red, hot, and/or swollen joints.  Otherwise, as above    PSYCH: + insomnia, ++ anxiety > + depression    Past Medical/Surgical History:  Past Medical History:   Diagnosis Date    Anxiety     Arthritis     Depression     Fractures      Past Surgical History:   Procedure Laterality Date    ANKLE FRACTURE SURGERY Left     COLONOSCOPY      EYE SURGERY  2013    WRIST FRACTURE SURGERY Left 2005       Allergies:  Review of patient's allergies indicates:  No Known Allergies    Social/Family History:  Social History     Socioeconomic History    Marital status:      Spouse name: Not on file     Number of children: Not on file    Years of education: Not on file    Highest education level: Not on file   Occupational History    Not on file   Social Needs    Financial resource strain: Not on file    Food insecurity:     Worry: Not on file     Inability: Not on file    Transportation needs:     Medical: Not on file     Non-medical: Not on file   Tobacco Use    Smoking status: Former Smoker    Smokeless tobacco: Never Used   Substance and Sexual Activity    Alcohol use: Yes    Drug use: Never    Sexual activity: Not Currently     Partners: Female   Lifestyle    Physical activity:     Days per week: Not on file     Minutes per session: Not on file    Stress: Not on file   Relationships    Social connections:     Talks on phone: Not on file     Gets together: Not on file     Attends [employer] service: Not on file     Active member of club or organization: Not on file     Attends meetings of clubs or organizations: Not on file     Relationship status: Not on file   Other Topics Concern    Not on file   Social History Narrative    Not on file     Family History   Problem Relation Age of Onset    Diabetes Mother     Cancer Father     Lupus Brother     Hypertension Brother     Asthma Son     Sleep apnea Son     Allergies Son     Allergies Daughter     Allergies Daughter      FAMILY HISTORY: negative for Connective Tissue Disease      Medications:    Current Outpatient Medications:     amitriptyline (ELAVIL) 10 MG tablet, , Disp: , Rfl:     dicyclomine (BENTYL) 20 mg tablet, Take 1 tablet (20 mg total) by mouth 4 (four) times daily before meals and nightly., Disp: 360 tablet, Rfl: 1    doxepin (SINEQUAN) 10 MG capsule, Take 10 mg by mouth every evening., Disp: , Rfl:     DULoxetine (CYMBALTA) 60 MG capsule, Take 60 mg by mouth 2 (two) times daily., Disp: , Rfl:     gabapentin (NEURONTIN) 100 MG capsule, Take 100 mg by mouth 2 (two) times daily., Disp: , Rfl:     hydrocortisone (ANUSOL-HC)  2.5 % rectal cream, Place rectally 2 (two) times daily., Disp: , Rfl:     hydrocortisone (PROCTOCORT) 10 % (80 mg) rectal foam, Place 1 applicator rectally 2 (two) times daily., Disp: , Rfl:     hydrocortisone-pramoxine (PROCTOFOAM-HS) rectal foam, hydrocortisone 1 %-pramoxine 1 % rectal foam  Insert by rectal route., Disp: , Rfl:     ibuprofen (ADVIL,MOTRIN) 800 MG tablet, Take 800 mg by mouth every 6 (six) hours as needed for Pain., Disp: , Rfl:     pantoprazole (PROTONIX) 40 MG tablet, Take 1 tablet (40 mg total) by mouth once daily., Disp: 90 tablet, Rfl: 3    polycarbophil (FIBERCON) 625 mg tablet, Take 625 mg by mouth once daily., Disp: , Rfl:     polyethylene glycol (MIRALAX) 17 gram/dose powder, Take 17 g by mouth as needed., Disp: , Rfl:     QUEtiapine (SEROQUEL) 50 MG tablet, , Disp: , Rfl:     tiZANidine (ZANAFLEX) 4 MG tablet, Take 4 mg by mouth every 6 (six) hours as needed., Disp: , Rfl:     vitamin D (VITAMIN D3) 1000 units Tab, Take 1,000 Units by mouth 2 (two) times daily., Disp: , Rfl:     Objective:     Vitals:  Blood pressure 112/64, temperature 97.2 °F (36.2 °C), weight 107 kg (235 lb 14.4 oz).    Physical Examination:   GEN:     wn/wd male in no apparent distress, seated comfortably in a chair for 45 min  SKIN:    no rashes (mild, healed acne vulgaris), no sclerodactyly, no Raynaud's, no periungual erythema, no digital tip tapering, no nailbed pitting  HEAD:   no alopecia, no scalp tenderness, no temporal artery tenderness or induration.  EYES:   no conjunctival pallor, no icterus  ENT:     no thrush, no mucosal dryness or ulcerations, adequate oral hygiene & dentition.  NECK:  supple x 6, no masses, no thyromegaly, no lymphadenopathy.  CV:        S1 and S2, RRR, no murmurs, gallop or rubs  CHEST: Normal respiratory effort;  normal breath sounds/no adventitious sounds. No signs of consolidation.  ABD:      non-tender and non-distended; soft; normal bowel sounds; no rebound, guarding,  or tenderness. No hepatosplenomegaly.  Musculoskeletal:  No evidence of inflammatory arthritis of the small joints of the hands or feet.  There is no evidence of degenerative disease at any level including the large joints.  There is no effusion, inflammation, deformity, or laxity.  Muscle strength is 5/5 x4 with excellent bulk in both upper and lower extremities. Triggerpoints are reactive in 6 of 6 FMS points and in 6 of 6 control points.  Range of motion of the back is adequate though hamstring tightness limits flexion be on 110°.  Posture demonstrates moderate thoracolumbar kyphosis with compensatory lumbosacral lordosis.  The gait is normal   EXTREM: no clubbing, cyanosis or edema. normal pulses. Robi's - x2  NEURO:  grossly intact; motor/sensory WNL; no tremors  PSYCH:  Anxious, initially pressured speech, able to focus, intellect normal, insight normal, not apparently psychotic, not suicidal, not homicidal    Labs:   Lab Results   Component Value Date    WBC 5.73 04/08/2020    HGB 16.0 04/08/2020    HCT 46.4 04/08/2020    MCV 86 04/08/2020     04/08/2020   CMP@  Sodium   Date Value Ref Range Status   04/08/2020 139 136 - 145 mmol/L Final     Potassium   Date Value Ref Range Status   04/08/2020 3.9 3.5 - 5.1 mmol/L Final     Chloride   Date Value Ref Range Status   04/08/2020 108 95 - 110 mmol/L Final     CO2   Date Value Ref Range Status   04/08/2020 24 23 - 29 mmol/L Final     Glucose   Date Value Ref Range Status   04/08/2020 106 70 - 110 mg/dL Final     BUN, Bld   Date Value Ref Range Status   04/08/2020 16 6 - 20 mg/dL Final     Creatinine   Date Value Ref Range Status   04/08/2020 0.9 0.5 - 1.4 mg/dL Final     Calcium   Date Value Ref Range Status   04/08/2020 9.0 8.7 - 10.5 mg/dL Final     Total Protein   Date Value Ref Range Status   04/08/2020 7.7 6.0 - 8.4 g/dL Final     Albumin   Date Value Ref Range Status   04/08/2020 4.4 3.5 - 5.2 g/dL Final     Total Bilirubin   Date Value Ref Range  Status   04/08/2020 0.7 0.1 - 1.0 mg/dL Final     Comment:     For infants and newborns, interpretation of results should be based  on gestational age, weight and in agreement with clinical  observations.  Premature Infant recommended reference ranges:  Up to 24 hours.............<8.0 mg/dL  Up to 48 hours............<12.0 mg/dL  3-5 days..................<15.0 mg/dL  6-29 days.................<15.0 mg/dL       Alkaline Phosphatase   Date Value Ref Range Status   04/08/2020 58 55 - 135 U/L Final     AST   Date Value Ref Range Status   04/08/2020 21 10 - 40 U/L Final     ALT   Date Value Ref Range Status   04/08/2020 19 10 - 44 U/L Final     CPK   Date Value Ref Range Status   06/02/2020 267 (H) 20 - 200 U/L Final     CRP   Date Value Ref Range Status   04/08/2020 0.04 0.00 - 0.75 mg/dL Final       Radiology/Diagnostic Studies:    None    Assessment/Discussion/Plan:   34 y.o. male with generalized musculoskeletal pain without clear evidence of inflammatory or autoimmune arthritis or myositis, in the setting of chronic anxiety-depressive disorder with PTSD--consistent with a somatization disorder, fibromyalgia/myofascial pain syndrome    PLAN:  I do not believe there is a rheumatologic diagnosis here.  I have explained the role of a rheumatologist and the disease is with which we deal.  I have explained further that I will order appropriate blood testing to rule out those conditions.    We discussed the impact of chronic anxiety on overall health.  Particularly, I focused on the common pathway of a dysfunctional sleep cycle leading to musclar exhaustion.  I detail the various overlaps with him including TMJ dysfunction, irritable bowel syndrome, and interstitial cystitis.  He seemed able to accept this as a working diagnosis.  I explained further that a mental health provider is required for both pharmacologic strategy and ongoing counseling.  Furthermore, I indicated that this will be a lifelong approach to his  problem.    I have reviewed his prescriptions and I have indicated that they may need to be adjusted. There are medications that are likely unhelpful such as his small dose doxepin, Elavil, and Neurontin, while tizanidine often increases sleep disturbances and high-dose ibuprofen aggravates reflux symptoms.  My personal strategy is to use the fewest prescription drugs possible so as not to overwhelm the patient daily with the complexity of the medical regimen.    I have provided him with literature on fibromyalgia and on beginning an exercise program.  While there is clearly an absence of atrophy, a stretching regimen would be helpful.    RTC:  I will see him back if testing reveals signs of rheumatologic disease or if he is re-referred.            Electronically signed by Dilan Baez MD    90 min were spent reviewing the records, taking the history, examining the patient, explaining the differential diagnosis, and offering him advice on stress management, etc          Answers for HPI/ROS submitted by the patient on 5/31/2020   fever: No  eye redness: No  headaches: Yes  shortness of breath: No  chest pain: No  trouble swallowing: No  diarrhea: Yes  constipation: Yes  unexpected weight change: No  genital sore: No  During the last 3 days, have you had a skin rash?: No  Bruises or bleeds easily: No  cough: No

## 2020-06-02 NOTE — LETTER
June 2, 2020      [facility]  [address] [facility] [place] [address]           [facility] - Rheumatology  [address]  [address] [place] [id number]  Phone: [phone]  Fax: [phone]          Patient: Primo Yeh   MR Number: [id number]   YOB: 1985   Date of Visit: 6/2/2020       Dear [facility]:    Thank you for referring Primo Yeh to me for evaluation. Attached you will find relevant portions of my assessment and plan of care.    If you have questions, please do not hesitate to call me. I look forward to following Primo Yeh along with you.    Sincerely,    Dilan Baez MD    Enclosure  CC:  No Recipients    If you would like to receive this communication electronically, please contact [email or web address] or [phone] to request more information on [employer] Link access.    For providers and/or their staff who would like to refer a patient to [facility], please contact us through our one-stop-shop provider referral line, [facility] , at [phone].    If you feel you have received this communication in error or would no longer like to receive these types of communications, please e-mail [email or web address]

## 2020-06-02 NOTE — PATIENT INSTRUCTIONS
Fibromyalgia  Fibromyalgia is a chronic condition. It causes pain and tenderness in connective tissues. This causes muscle pain. Often, there are also many tender areas throughout the body. Symptoms may also include stiffness and feelings of numbness and tingling. Symptoms may be worse upon waking up. They may increase with poor sleep, heavy activity, cold or damp weather, anxiety, or stress.  People with fibromyalgia often feel tired. They may have trouble sleeping. Other symptoms include morning stiffness, headaches, and painful menstrual periods. Some people have problems with thinking clearly and changes in memory.  The cause of fibromyalgia is not known. Sy  What's in an Exercise Program  An exercise program includes more than just your daily activity. Be sure to warm up before you start and cool down when you're done. Also include exercises to strengthen your muscles 2 or 3 days a week.  Talk to a healthcare provider to learn about the best strength-building exercises for you.     The goal for adults is 150 minutes of aerobic activity per week or about 30 minutes at least 5 days per week. This can be done as 3, 10-minute sessions per day if preferred. Brisk walking, jogging, swimming, and biking are all types of moderate-intensity exercise that can be beneficial.  Date Last Reviewed: 4/1/2017  © 4679-0148 [employer]. [address] [place] [address], [place] [address]. All rights reserved. This information is not intended as a substitute for professional medical care. Always follow your healthcare professional's instructions.      mptoms are similar to that of other diseases. These include rheumatoid arthritis, low thyroid, chronic fatigue syndrome, and Lyme disease. In some cases, these diseases may occur together.  Fibromyalgia is often treated with medicines. You and your healthcare provider can discuss the medicine that may work best for you. You may have to try more than one medicine or combination  of medicines before you find what works for you.  Home care  · If your healthcare provider has prescribed or recommended medicines, take them as directed.  · Rest as needed. Try to get enough sleep. If you have trouble sleeping, discuss this with your healthcare provider.   · Be active. Regular exercise can help manage symptoms. Some options include walking, swimming, and biking. Strengthening exercises may also be helpful. Talk to your healthcare provider about the best ways to be active.  · Follow a healthy diet. Limit caffeine and alcohol. If you smoke, ask your healthcare provider for help to stop.  · Notice how your body reacts to stress. Learn to listen to your body signals. This will help you take action before the stress becomes severe.  · Learn relaxation techniques. Also consider joining a stress reduction program or class.  · Talk to your healthcare provider about trying complementary treatments. These include acupuncture, hypnosis, and biofeedback. Yoga and sirisha chi may be helpful.  · Ask your healthcare provider about cognitive behavioral therapy (CBT). This type of counseling can help people with fibromyalgia cope better with their illness.  Follow-up care  Follow up with your healthcare provider or as advised by our staff. In many cases, fibromyalgia is best treated with a team approach.  This may involve your primary care provider, a rheumatologist, a physical therapist, and a mental health professional.   For more information: National [place] of Arth[employer] and Musculoskeletal and Skin Diseases (NIAMS)  www.niams.nih.gov 595-728-4002  When to seek medical advice  Contact your healthcare provider right away if any of these occur:  · Symptoms getting worse or new symptoms developing  · You feel hopeless, helpless, or lose interest in day-to-day life  Date Last Reviewed: 3/1/2017  © 8157-8029 [employer]. [address] [place] [address], [place] [address]. All rights reserved. This information is  not intended as a substitute for professional medical care. Always follow your healthcare professional's instructions.

## 2020-06-03 LAB
ANA TITR SER IF: NEGATIVE {TITER}
ENA SS-A AB SER-ACNC: <0.2 AI (ref 0–0.9)

## 2020-06-04 LAB
ALBUMIN SERPL ELPH-MCNC: 4 G/DL (ref 2.9–4.4)
ALBUMIN/GLOB SERPL: 1.2 {RATIO} (ref 0.7–1.7)
ALPHA1 GLOB SERPL ELPH-MCNC: 0.2 G/DL (ref 0–0.4)
ALPHA2 GLOB SERPL ELPH-MCNC: 1 G/DL (ref 0.4–1)
B-GLOBULIN SERPL ELPH-MCNC: 0.9 G/DL (ref 0.7–1.3)
CCP IGA+IGG SERPL IA-ACNC: 6 UNITS (ref 0–19)
GAMMA GLOB SERPL ELPH-MCNC: 1.3 G/DL (ref 0.4–1.8)
GLOBULIN SER CALC-MCNC: 3.4 G/DL (ref 2.2–3.9)
LABORATORY COMMENT REPORT: NORMAL
M PROTEIN SERPL ELPH-MCNC: NORMAL G/DL
PROT SERPL-MCNC: 7.4 G/DL (ref 6–8.5)

## 2020-06-08 LAB — HLA-B27 QL NAA+PROBE: NEGATIVE

## 2020-06-23 ENCOUNTER — OFFICE VISIT (OUTPATIENT)
Dept: SURGERY | Facility: CLINIC | Age: 35
End: 2020-06-23
Payer: OTHER GOVERNMENT

## 2020-06-23 VITALS
WEIGHT: 235.25 LBS | HEART RATE: 81 BPM | HEIGHT: 69 IN | DIASTOLIC BLOOD PRESSURE: 74 MMHG | TEMPERATURE: 98 F | BODY MASS INDEX: 34.84 KG/M2 | SYSTOLIC BLOOD PRESSURE: 119 MMHG

## 2020-06-23 DIAGNOSIS — K60.2 FISSURE IN ANO: Primary | ICD-10-CM

## 2020-06-23 PROCEDURE — 99244 OFF/OP CNSLTJ NEW/EST MOD 40: CPT | Mod: S$PBB,,, | Performed by: SURGERY

## 2020-06-23 PROCEDURE — 99999 PR PBB SHADOW E&M-EST. PATIENT-LVL IV: ICD-10-PCS | Mod: PBBFAC,,, | Performed by: SURGERY

## 2020-06-23 PROCEDURE — 99999 PR PBB SHADOW E&M-EST. PATIENT-LVL IV: CPT | Mod: PBBFAC,,, | Performed by: SURGERY

## 2020-06-23 PROCEDURE — 99214 OFFICE O/P EST MOD 30 MIN: CPT | Mod: PBBFAC,PO | Performed by: SURGERY

## 2020-06-23 PROCEDURE — 99244 PR OFFICE CONSULTATION,LEVEL IV: ICD-10-PCS | Mod: S$PBB,,, | Performed by: SURGERY

## 2020-06-23 NOTE — Clinical Note
I saw your patient, Primo Yeh in the office.  Attached are my findings and plan.  Thank you for referring him to my office and if you have any questions please do not hesitate to call my cell [phone].    Jordan Alston

## 2020-06-23 NOTE — PROGRESS NOTES
Subjective:       Patient ID: Primo Yeh is a 34 y.o. male.    Chief Complaint: Consult (Hemorrhoids)    HPI  this is a pleasant 34-year-old male who was referred to me in consultation from Dr. Wagoner for evaluation of rectal pain and discomfort.  Patient notes he has been struggling with this issue for the last year or so.  He states that he has significant pain and tenderness in association with his bowel movements.  He describes bleeding on occasion in conjunction with wiping after his bowel movements.  He states that he feels like he has a hemorrhoids are causing his pain.  He does describe tissue prolapsing at times.  He has had no fevers or chills.  He he did have a colonoscopy approximately 1-2 years ago with no significant findings.  Patient's past medical history significant for fibromyalgia and irritable bowel syndrome.  No significant past surgical history.  Review of Systems   Constitutional: Negative for activity change, appetite change, diaphoresis, fever and unexpected weight change.   Respiratory: Negative for cough, chest tightness and wheezing.    Cardiovascular: Negative for chest pain and palpitations.   Gastrointestinal: Positive for abdominal pain, anal bleeding, constipation and diarrhea. Negative for abdominal distention, blood in stool, nausea, rectal pain and vomiting.   Genitourinary: Negative for difficulty urinating, dysuria and hematuria.   Musculoskeletal: Negative for back pain and gait problem.   Neurological: Negative for tremors and seizures.         Objective:      Physical Exam  Vitals signs reviewed.   Constitutional:       Appearance: He is well-developed.   HENT:      Head: Normocephalic and atraumatic.   Eyes:      General: No scleral icterus.        Left eye: No discharge.      Pupils: Pupils are equal, round, and reactive to light.   Neck:      Musculoskeletal: Normal range of motion.      Thyroid: No thyromegaly.      Trachea: No tracheal deviation.   Cardiovascular:       Rate and Rhythm: Normal rate and regular rhythm.      Heart sounds: Normal heart sounds. No murmur. No friction rub. No gallop.    Pulmonary:      Effort: Pulmonary effort is normal.      Breath sounds: Normal breath sounds. No wheezing.   Chest:      Chest wall: No tenderness.   Abdominal:      General: There is no distension.      Palpations: There is no mass.      Tenderness: There is no abdominal tenderness. There is no guarding or rebound.   Genitourinary:     Comments: External exam demonstrates a posterior midline fissure in ANO  Musculoskeletal: Normal range of motion.   Skin:     General: Skin is warm.   Neurological:      Mental Status: He is alert and oriented to person, place, and time.      Coordination: Coordination normal.         Assessment:     fissure in ANO  No diagnosis found.    Plan:       Lengthy d/w pt regarding fissures, and there natural progression.  First recommend conservative mgmt of the fissure.  I have recommended increasing fiber in diet, fiber supplement, and stool softener.  In addition will begin topical diltiazem.  IF no improvement over the next 6 weeks would consider surgical sphincterotomy.   Discussed with patient that when he returns assuming he is asymptomatic will perform an anoscopy to evaluate his internal hemorrhoids.

## 2020-06-24 DIAGNOSIS — K21.9 GASTROESOPHAGEAL REFLUX DISEASE, ESOPHAGITIS PRESENCE NOT SPECIFIED: ICD-10-CM

## 2020-06-25 RX ORDER — PANTOPRAZOLE SODIUM 40 MG/1
40 TABLET, DELAYED RELEASE ORAL DAILY
Qty: 90 TABLET | Refills: 3 | Status: SHIPPED | OUTPATIENT
Start: 2020-06-25 | End: 2021-06-25

## 2020-07-30 ENCOUNTER — OFFICE VISIT (OUTPATIENT)
Dept: GASTROENTEROLOGY | Facility: CLINIC | Age: 35
End: 2020-07-30
Payer: OTHER GOVERNMENT

## 2020-07-30 VITALS
SYSTOLIC BLOOD PRESSURE: 115 MMHG | DIASTOLIC BLOOD PRESSURE: 74 MMHG | WEIGHT: 228 LBS | RESPIRATION RATE: 18 BRPM | OXYGEN SATURATION: 98 % | HEART RATE: 81 BPM | BODY MASS INDEX: 33.77 KG/M2 | HEIGHT: 69 IN

## 2020-07-30 DIAGNOSIS — K44.9 HIATAL HERNIA: ICD-10-CM

## 2020-07-30 DIAGNOSIS — K64.9 HEMORRHOIDS, UNSPECIFIED HEMORRHOID TYPE: ICD-10-CM

## 2020-07-30 DIAGNOSIS — K62.89 ANAL OR RECTAL PAIN: ICD-10-CM

## 2020-07-30 DIAGNOSIS — K42.9 UMBILICAL HERNIA WITHOUT OBSTRUCTION AND WITHOUT GANGRENE: Primary | ICD-10-CM

## 2020-07-30 PROCEDURE — 99999 PR PBB SHADOW E&M-EST. PATIENT-LVL V: CPT | Mod: PBBFAC,,, | Performed by: INTERNAL MEDICINE

## 2020-07-30 PROCEDURE — 99215 OFFICE O/P EST HI 40 MIN: CPT | Mod: PBBFAC,PN | Performed by: INTERNAL MEDICINE

## 2020-07-30 PROCEDURE — 99999 PR PBB SHADOW E&M-EST. PATIENT-LVL V: ICD-10-PCS | Mod: PBBFAC,,, | Performed by: INTERNAL MEDICINE

## 2020-07-30 PROCEDURE — 99214 OFFICE O/P EST MOD 30 MIN: CPT | Mod: S$PBB,,, | Performed by: INTERNAL MEDICINE

## 2020-07-30 PROCEDURE — 99214 PR OFFICE/OUTPT VISIT, EST, LEVL IV, 30-39 MIN: ICD-10-PCS | Mod: S$PBB,,, | Performed by: INTERNAL MEDICINE

## 2020-07-30 RX ORDER — PREGABALIN 75 MG/1
CAPSULE ORAL
COMMUNITY
Start: 2020-07-10 | End: 2020-12-01 | Stop reason: DRUGHIGH

## 2020-07-30 RX ORDER — CLONAZEPAM 1 MG/1
TABLET ORAL
COMMUNITY
Start: 2020-07-22

## 2020-07-30 RX ORDER — DICLOFENAC SODIUM 50 MG/1
TABLET, DELAYED RELEASE ORAL
COMMUNITY
Start: 2020-07-01

## 2020-07-30 RX ORDER — ESCITALOPRAM OXALATE 20 MG/1
20 TABLET ORAL DAILY
COMMUNITY
Start: 2020-07-22

## 2020-07-30 NOTE — LETTER
July 31, 2020      Binh Mondragon, DO  [address] [facility] [place] [address]           [facility]  [address], [address]  [place] [facility] [place] [address]  Phone: [phone]  Fax: [phone]          Patient: Primo Yeh   MR Number: [id number]   YOB: 1985   Date of Visit: 7/30/2020       Dear Dr. Binh Mondragon:    Thank you for referring Primo Yeh to me for evaluation. Attached you will find relevant portions of my assessment and plan of care.    If you have questions, please do not hesitate to call me. I look forward to following Primo Yeh along with you.    Sincerely,    Hussein Wagoner MD    Enclosure  CC:  No Recipients    If you would like to receive this communication electronically, please contact [email or web address] or [phone] to request more information on [employer] access.    For providers and/or their staff who would like to refer a patient to [facility], please contact us through our one-stop-shop provider referral line, [facility] , at [phone].    If you feel you have received this communication in error or would no longer like to receive these types of communications, please e-mail [email or web address]

## 2020-07-30 NOTE — PATIENT INSTRUCTIONS
He will continue the Protonix and reflux regimen.  He is scheduled to be re-evaluated by the rectal surgeon for the anal pain and hemorrhoids.  Additionally he is having more abdominal pain with the umbilical hernia and is referred to the general surgeon.  He continues his other medications vitamins and minerals.  He will make a food diary and avoid the offending foods.

## 2020-07-31 NOTE — PROGRESS NOTES
Subjective:       Patient ID: Primo Yeh is a 34 y.o. male.    Chief Complaint: Follow-up    He states his current bowel program is helped the shin.  He has a being evaluated by the rectal surgeon.  He has chronic anal pain and long history of hemorrhoids and anal discomfort.  He denies anal discomfort with bowel movements.  He denies hematemesis hematochezia jaundice or bleeding.  He denies pyrosis or dyspepsia.  He has a history gastroesophageal reflux but denies aspiration.  He has an umbilical hernia.  He states that the umbilical hernia is hurting him.  He he believes that he can not reduce the hernia but sometimes a sticks out .  He has had occasional nausea without vomiting.  Emotionally he is markedly improved.  He is ingesting and nutritious diet.  He made a food diary and he is avoiding the offending foods.  He has a ingesting adequate fiber.      Allergies:  Review of patient's allergies indicates:  No Known Allergies    Medications:    Current Outpatient Medications:     clonazePAM (KLONOPIN) 1 MG tablet, , Disp: , Rfl:     diclofenac (VOLTAREN) 50 MG EC tablet, , Disp: , Rfl:     dicyclomine (BENTYL) 20 mg tablet, Take 1 tablet (20 mg total) by mouth 4 (four) times daily before meals and nightly., Disp: 360 tablet, Rfl: 1    diltiazem HCl (DILTIAZEM 2% CREAM), Apply topically 3 (three) times daily. Apply topically to anal area., Disp: 30 g, Rfl: 0    DULoxetine (CYMBALTA) 60 MG capsule, Take 60 mg by mouth 2 (two) times daily., Disp: , Rfl:     escitalopram oxalate (LEXAPRO) 10 MG tablet, , Disp: , Rfl:     hydrocortisone (ANUSOL-HC) 2.5 % rectal cream, Place rectally 2 (two) times daily., Disp: , Rfl:     hydrocortisone (PROCTOCORT) 10 % (80 mg) rectal foam, Place 1 applicator rectally 2 (two) times daily., Disp: , Rfl:     ibuprofen (ADVIL,MOTRIN) 800 MG tablet, Take 800 mg by mouth every 6 (six) hours as needed for Pain., Disp: , Rfl:     pantoprazole (PROTONIX) 40 MG tablet, Take 1  tablet (40 mg total) by mouth once daily., Disp: 90 tablet, Rfl: 3    polycarbophil (FIBERCON) 625 mg tablet, Take 625 mg by mouth once daily., Disp: , Rfl:     polyethylene glycol (MIRALAX) 17 gram/dose powder, Take 17 g by mouth as needed., Disp: , Rfl:     pregabalin (LYRICA) 75 MG capsule, , Disp: , Rfl:     QUEtiapine (SEROQUEL) 50 MG tablet, , Disp: , Rfl:     tiZANidine (ZANAFLEX) 4 MG tablet, Take 4 mg by mouth every 6 (six) hours as needed., Disp: , Rfl:     vitamin D (VITAMIN D3) 1000 units Tab, Take 1,000 Units by mouth 2 (two) times daily., Disp: , Rfl:     Past Medical History:   Diagnosis Date    Anxiety     Arthritis     Depression     Fractures        Past Surgical History:   Procedure Laterality Date    ANKLE FRACTURE SURGERY Left     COLONOSCOPY      EYE SURGERY  2013    WRIST FRACTURE SURGERY Left 2005         Review of Systems   Constitutional: Negative for appetite change, fever and unexpected weight change.   HENT: Negative for trouble swallowing.         No jaundice.   Respiratory: Negative for cough, shortness of breath and wheezing.         He is a former cigarette smoker.  He denies dysphagia aspiration hemoptysis chronic cough chronic sputum production or dyspnea on exertion.   Cardiovascular: Negative for chest pain.        He denies exertional chest pain or rhythm disturbance.   Gastrointestinal: Positive for abdominal pain and rectal pain. Negative for abdominal distention, anal bleeding, blood in stool, constipation, diarrhea, nausea and vomiting.   Musculoskeletal: Negative for back pain and neck pain.   Skin: Negative for pallor and rash.   Neurological: Negative for dizziness, seizures, syncope, speech difficulty, weakness and numbness.   Hematological: Negative for adenopathy.   Psychiatric/Behavioral: Negative for confusion.       Objective:      Physical Exam  Vitals signs reviewed.   Constitutional:       Appearance: He is well-developed.      Comments:  Well-nourished well-hydrated afebrile nonicteric  male.  He is oriented x3.  He can relate his history and answer questions appropriately.  He is sitting comfortably in the chair.  He is normocephalic.  Pupils are normal.   HENT:      Head: Normocephalic.   Eyes:      Pupils: Pupils are equal, round, and reactive to light.   Neck:      Musculoskeletal: Normal range of motion and neck supple.      Thyroid: No thyromegaly.      Trachea: No tracheal deviation.   Cardiovascular:      Rate and Rhythm: Normal rate and regular rhythm.      Heart sounds: Normal heart sounds.   Pulmonary:      Effort: Pulmonary effort is normal.      Breath sounds: Normal breath sounds.   Abdominal:      General: There is no distension.      Palpations: Abdomen is soft. There is no mass.      Tenderness: There is abdominal tenderness. There is no right CVA tenderness, left CVA tenderness, guarding or rebound.      Hernia: A hernia is present.      Comments: The abdomen is soft is mild tenderness in the periumbilical area.  The umbilical hernia is reducible.  Bowel sounds are normal.  Masses are absent.   Musculoskeletal: Normal range of motion.      Comments: He can ambulate normally.  He can go from the sitting the standing position without difficulty.   Lymphadenopathy:      Cervical: No cervical adenopathy.   Skin:     General: Skin is warm and dry.   Neurological:      Mental Status: He is alert and oriented to person, place, and time.      Cranial Nerves: No cranial nerve deficit.   Psychiatric:         Behavior: Behavior normal.           Plan:       Umbilical hernia without obstruction and without gangrene  -     Ambulatory referral/consult to General Surgery; Future; Expected date: 08/06/2020    Hiatal hernia    Anal or rectal pain    Hemorrhoids, unspecified hemorrhoid type     He will continue his reflux regimen current medications vitamins and minerals.  He continues his bowel program with adequate fiber.  He will  avoid straining.  He follows up with the rectal surgeon for the chronic anal pain and hemorrhoids.  He is referred to the general surgeon for evaluation of the umbilical hernia.  He continues his psychological care and current medications vitamins and minerals.

## 2020-08-25 ENCOUNTER — OFFICE VISIT (OUTPATIENT)
Dept: SURGERY | Facility: CLINIC | Age: 35
End: 2020-08-25
Payer: OTHER GOVERNMENT

## 2020-08-25 ENCOUNTER — OFFICE VISIT (OUTPATIENT)
Dept: RHEUMATOLOGY | Facility: CLINIC | Age: 35
End: 2020-08-25
Payer: OTHER GOVERNMENT

## 2020-08-25 VITALS
BODY MASS INDEX: 33.8 KG/M2 | WEIGHT: 228.19 LBS | HEIGHT: 69 IN | TEMPERATURE: 99 F | DIASTOLIC BLOOD PRESSURE: 70 MMHG | SYSTOLIC BLOOD PRESSURE: 118 MMHG | HEART RATE: 62 BPM

## 2020-08-25 DIAGNOSIS — K60.2 FISSURE IN ANO: Primary | ICD-10-CM

## 2020-08-25 DIAGNOSIS — M79.7 FIBROMYALGIA: Primary | ICD-10-CM

## 2020-08-25 DIAGNOSIS — Z86.59 HISTORY OF POSTTRAUMATIC STRESS DISORDER (PTSD): ICD-10-CM

## 2020-08-25 PROCEDURE — 99213 PR OFFICE/OUTPT VISIT, EST, LEVL III, 20-29 MIN: ICD-10-PCS | Mod: S$PBB,,, | Performed by: SURGERY

## 2020-08-25 PROCEDURE — 99999 PR PBB SHADOW E&M-EST. PATIENT-LVL III: ICD-10-PCS | Mod: PBBFAC,,, | Performed by: SURGERY

## 2020-08-25 PROCEDURE — 99999 PR PBB SHADOW E&M-EST. PATIENT-LVL III: CPT | Mod: PBBFAC,,, | Performed by: SURGERY

## 2020-08-25 PROCEDURE — 99213 OFFICE O/P EST LOW 20 MIN: CPT | Mod: S$PBB,,, | Performed by: SURGERY

## 2020-08-25 PROCEDURE — 99213 OFFICE O/P EST LOW 20 MIN: CPT | Mod: PBBFAC,PO | Performed by: SURGERY

## 2020-08-25 PROCEDURE — 99213 OFFICE O/P EST LOW 20 MIN: CPT | Mod: 95,,, | Performed by: INTERNAL MEDICINE

## 2020-08-25 PROCEDURE — 99213 PR OFFICE/OUTPT VISIT, EST, LEVL III, 20-29 MIN: ICD-10-PCS | Mod: 95,,, | Performed by: INTERNAL MEDICINE

## 2020-08-25 RX ORDER — MELOXICAM 15 MG/1
1 TABLET ORAL DAILY
COMMUNITY

## 2020-08-25 NOTE — LETTER
August 25, 2020      [facility]   [address]  [place] [address]

## 2020-08-25 NOTE — PROGRESS NOTES
Subjective:        The chief complaint leading to consultation is:  FMS  The patient location is:  Home  Visit type: Virtual visit with synchronous audio/video or audio only  This was a video visit in lieu of in-person visit due to the coronavirus emergency. Patient acknowledged and consented to the video visit encounter.     HPI the patient reports no new symptoms since his prior visit.    Past Surgical History:   Procedure Laterality Date    ANKLE FRACTURE SURGERY Left     COLONOSCOPY      EYE SURGERY  2013    WRIST FRACTURE SURGERY Left 2005     Past Medical History:   Diagnosis Date    Anxiety     Arthritis     Depression     Fractures      Family History   Problem Relation Age of Onset    Diabetes Mother     Cancer Father     Lupus Brother     Hypertension Brother     Asthma Son     Sleep apnea Son     Allergies Son     Allergies Daughter     Allergies Daughter         Social History:   Marital Status:   Alcohol History:  reports current alcohol use.  Tobacco History:  reports that he has quit smoking. He has never used smokeless tobacco.  Drug History:  reports no history of drug use.    Review of patient's allergies indicates:  No Known Allergies    Current Outpatient Medications   Medication Sig Dispense Refill    clonazePAM (KLONOPIN) 1 MG tablet       diclofenac (VOLTAREN) 50 MG EC tablet       dicyclomine (BENTYL) 20 mg tablet Take 1 tablet (20 mg total) by mouth 4 (four) times daily before meals and nightly. 360 tablet 1    diltiazem HCl (DILTIAZEM 2% CREAM) Apply topically 3 (three) times daily. Apply topically to anal area. 30 g 0    DULoxetine (CYMBALTA) 60 MG capsule Take 60 mg by mouth 2 (two) times daily.      escitalopram oxalate (LEXAPRO) 10 MG tablet       hydrocortisone (ANUSOL-HC) 2.5 % rectal cream Place rectally 2 (two) times daily.      hydrocortisone (PROCTOCORT) 10 % (80 mg) rectal foam Place 1 applicator rectally 2 (two) times daily.      ibuprofen  (ADVIL,MOTRIN) 800 MG tablet Take 800 mg by mouth every 6 (six) hours as needed for Pain.      pantoprazole (PROTONIX) 40 MG tablet Take 1 tablet (40 mg total) by mouth once daily. 90 tablet 3    polycarbophil (FIBERCON) 625 mg tablet Take 625 mg by mouth once daily.      polyethylene glycol (MIRALAX) 17 gram/dose powder Take 17 g by mouth as needed.      pregabalin (LYRICA) 75 MG capsule       QUEtiapine (SEROQUEL) 50 MG tablet       tiZANidine (ZANAFLEX) 4 MG tablet Take 4 mg by mouth every 6 (six) hours as needed.      vitamin D (VITAMIN D3) 1000 units Tab Take 1,000 Units by mouth 2 (two) times daily.       No current facility-administered medications for this visit.        Review of Systems   Constitutional: Negative for fever and unexpected weight change.   HENT: Negative for mouth sores and trouble swallowing.    Eyes: Negative for redness.   Respiratory: Negative for cough and shortness of breath.    Cardiovascular: Negative for chest pain.   Gastrointestinal: Positive for constipation and diarrhea.   Genitourinary: Negative for genital sores.   Skin: Positive for rash.   Neurological: Positive for headaches.   Hematological: Does not bruise/bleed easily.         Objective:        Physical Exam:   Physical Exam         Assessment:       Fibromyalgia secondary to underlying anxiety-depressive disorder with an existing diagnosis of PTSD    Plan:     He is being seen by a psychiatrist connected with  [facility].    I will see him back on an as-needed basis.          Total time spent with patient: 12 minutes    Each patient to whom he or she provides medical services by telemedicine is:  (1) informed of the relationship between the physician and patient and the respective role of any other health care provider with respect to management of the patient; and (2) notified that he or she may decline to receive medical services by telemedicine and may withdraw from such care at any time.    This  note was created using alike voice recognition software that occasionally misinterprets phrases or words.

## 2020-08-25 NOTE — PROGRESS NOTES
Subjective:       Patient ID: Primo Yeh is a 34 y.o. male.    Chief Complaint: Follow-up (6wk f/u hemorrhoids)    HPI  pleasant 34-year-old male returns for evaluation of fissure in ANO.  Patient was seen by me 6 weeks ago noted to have a posterior midline fissure in ANO.  He tried conservative treatment with topical diltiazem and reports he continues to have pain and discomfort.  He notes pain is associated with bowel movements.  Describes the pain as sharp.  No fevers or chills he has no other significant medical history no significant past surgical history  Review of Systems   Constitutional: Negative for activity change, appetite change, diaphoresis, fever and unexpected weight change.   Respiratory: Negative for cough, chest tightness and wheezing.    Cardiovascular: Negative for chest pain and palpitations.   Gastrointestinal: Negative for abdominal distention, abdominal pain, anal bleeding, blood in stool, constipation, diarrhea, nausea, rectal pain and vomiting.   Genitourinary: Negative for difficulty urinating, dysuria and hematuria.   Musculoskeletal: Negative for back pain and gait problem.   Neurological: Negative for tremors and seizures.         Objective:      Physical Exam  Vitals signs reviewed.   Constitutional:       Appearance: He is well-developed.   HENT:      Head: Normocephalic and atraumatic.   Eyes:      Pupils: Pupils are equal, round, and reactive to light.   Neck:      Musculoskeletal: Normal range of motion.      Thyroid: No thyromegaly.      Trachea: No tracheal deviation.   Cardiovascular:      Rate and Rhythm: Normal rate and regular rhythm.      Heart sounds: Normal heart sounds. No murmur. No friction rub. No gallop.    Pulmonary:      Effort: Pulmonary effort is normal.      Breath sounds: Normal breath sounds. No wheezing.   Chest:      Chest wall: No tenderness.   Abdominal:      General: There is no distension.      Palpations: There is no mass.      Tenderness: There is  no abdominal tenderness. There is no guarding or rebound.   Genitourinary:     Comments: Post midline fissure inano    Musculoskeletal: Normal range of motion.   Skin:     General: Skin is warm.   Neurological:      Mental Status: He is alert and oriented to person, place, and time.      Coordination: Coordination normal.         Assessment:     fissure inano  No diagnosis found.    Plan:     Pt with conintued pain and discomfort with persistent fissure on exam.  He has failed conservative treatment and as such recommend sphincterotomy.  Risks and benefits of surgery were d/w pt and informed consent obtained.

## 2020-11-24 ENCOUNTER — OFFICE VISIT (OUTPATIENT)
Dept: URBAN - METROPOLITAN AREA CLINIC 22 | Facility: CLINIC | Age: 35
End: 2020-11-24
Payer: COMMERCIAL

## 2020-11-24 DIAGNOSIS — H52.13 MYOPIA, BILATERAL: Primary | ICD-10-CM

## 2020-11-24 PROCEDURE — 92310 CONTACT LENS FITTING OU: CPT | Performed by: OPTOMETRIST

## 2020-11-24 PROCEDURE — 92014 COMPRE OPH EXAM EST PT 1/>: CPT | Performed by: OPTOMETRIST

## 2020-11-24 ASSESSMENT — KERATOMETRY
OS: 42.40
OD: 42.48

## 2020-11-24 ASSESSMENT — VISUAL ACUITY
OD: 20/20
OS: 20/20

## 2020-11-24 ASSESSMENT — INTRAOCULAR PRESSURE
OD: 13
OS: 14

## 2020-12-01 ENCOUNTER — OFFICE VISIT (OUTPATIENT)
Dept: RHEUMATOLOGY | Facility: CLINIC | Age: 35
End: 2020-12-01
Payer: OTHER GOVERNMENT

## 2020-12-01 VITALS
WEIGHT: 248.81 LBS | BODY MASS INDEX: 36.74 KG/M2 | TEMPERATURE: 97 F | DIASTOLIC BLOOD PRESSURE: 78 MMHG | SYSTOLIC BLOOD PRESSURE: 118 MMHG

## 2020-12-01 DIAGNOSIS — Z86.59 HISTORY OF POSTTRAUMATIC STRESS DISORDER (PTSD): ICD-10-CM

## 2020-12-01 DIAGNOSIS — M79.18 MYOFASCIAL PAIN SYNDROME: Primary | ICD-10-CM

## 2020-12-01 PROCEDURE — 99213 PR OFFICE/OUTPT VISIT, EST, LEVL III, 20-29 MIN: ICD-10-PCS | Mod: S$GLB,,, | Performed by: INTERNAL MEDICINE

## 2020-12-01 PROCEDURE — 99213 OFFICE O/P EST LOW 20 MIN: CPT | Mod: S$GLB,,, | Performed by: INTERNAL MEDICINE

## 2020-12-01 RX ORDER — PREGABALIN 150 MG/1
CAPSULE ORAL 2 TIMES DAILY
COMMUNITY
Start: 2020-11-09

## 2020-12-01 NOTE — PROGRESS NOTES
[facility] RHEUMATOLOGY           Follow-up visit    Notes dictated to M*Modal. Please forgive any unintended errors.  Subjective:       Patient ID:   NAME: Primo Yeh : 1985     35 y.o. male    Referring Doc: [facility]  Other Physicians:    Chief Complaint:  Fibromyalgia      HPI/Interval History:  The patient is doing very well.  He has much less joint pain and stiffness.  His moods have been stable.  He is sleeping better.    ROS:   GEN:    no fever, night sweats or weight loss  SKIN:   no rashes, bruising, or swelling, no Raynauds, no photosensitivity  HEENT: no changes in vision, no mouth ulcers, no sicca symptoms, no scalp tenderness, no jaw claudication.  CV:      no CP, PND, CAREY, orthopnea, no palpitations  PULM: no SOB, cough, hemoptysis, sputum or pleuritic pain  GI:        no dysphagia, no GERD, no hematemesis, no abdominal pain, nausea, vomiting, constipation, diarrhea, melanotic stools, BRBPR  :      no hematuria, dysuria  NEURO: no paresthesias, headaches, acute visual disturbances  MUSCULOSKELETAL:  As above   PSYCH:   No increased insomnia, no increased anxiety, no increased depression    Past Medical/Surgical History:  Past Medical History:   Diagnosis Date    Anxiety     Arthritis     Depression     Fractures      Past Surgical History:   Procedure Laterality Date    ANKLE FRACTURE SURGERY Left     COLONOSCOPY      EYE SURGERY  2013    WRIST FRACTURE SURGERY Left        Allergies:  Review of patient's allergies indicates:  No Known Allergies    Social/Family History:  Social History     Socioeconomic History    Marital status:      Spouse name: Not on file    Number of children: Not on file    Years of education: Not on file    Highest education level: Not on file   Occupational History    Not on file   Social Needs    Financial resource strain: Not on file    Food insecurity     Worry: Not on file     Inability: Not on file    Transportation  needs     Medical: Not on file     Non-medical: Not on file   Tobacco Use    Smoking status: Former Smoker    Smokeless tobacco: Never Used   Substance and Sexual Activity    Alcohol use: Yes    Drug use: Never    Sexual activity: Not Currently     Partners: Female   Lifestyle    Physical activity     Days per week: Not on file     Minutes per session: Not on file    Stress: Not on file   Relationships    Social connections     Talks on phone: Not on file     Gets together: Not on file     Attends Rastafari service: Not on file     Active member of club or organization: Not on file     Attends meetings of clubs or organizations: Not on file     Relationship status: Not on file   Other Topics Concern    Not on file   Social History Narrative    Not on file     Family History   Problem Relation Age of Onset    Diabetes Mother     Cancer Father     Lupus Brother     Hypertension Brother     Asthma Son     Sleep apnea Son     Allergies Son     Allergies Daughter     Allergies Daughter      FAMILY HISTORY: negative for Connective Tissue Disease      Medications:    Current Outpatient Medications:     clonazePAM (KLONOPIN) 1 MG tablet, , Disp: , Rfl:     diclofenac (VOLTAREN) 50 MG EC tablet, , Disp: , Rfl:     diltiazem HCl (DILTIAZEM 2% CREAM), Apply topically 3 (three) times daily. Apply topically to anal area., Disp: 30 g, Rfl: 0    DULoxetine (CYMBALTA) 60 MG capsule, Take 60 mg by mouth 2 (two) times daily., Disp: , Rfl:     escitalopram oxalate (LEXAPRO) 20 MG tablet, 20 mg once daily. , Disp: , Rfl:     hydrocortisone (ANUSOL-HC) 2.5 % rectal cream, Place rectally 2 (two) times daily., Disp: , Rfl:     hydrocortisone (PROCTOCORT) 10 % (80 mg) rectal foam, Place 1 applicator rectally 2 (two) times daily., Disp: , Rfl:     ibuprofen (ADVIL,MOTRIN) 800 MG tablet, Take 800 mg by mouth every 6 (six) hours as needed for Pain., Disp: , Rfl:     meloxicam (MOBIC) 15 MG tablet, Take 1 tablet by  mouth once daily., Disp: , Rfl:     nitroglycerin (NITROGLYN) 0.4 % ointment, Place onto the skin as needed. Apply to anorectal area 2-3 times daily, Disp: , Rfl:     pantoprazole (PROTONIX) 40 MG tablet, Take 1 tablet (40 mg total) by mouth once daily., Disp: 90 tablet, Rfl: 3    polycarbophil (FIBERCON) 625 mg tablet, Take 625 mg by mouth once daily., Disp: , Rfl:     polyethylene glycol (MIRALAX) 17 gram/dose powder, Take 17 g by mouth as needed., Disp: , Rfl:     pregabalin (LYRICA) 150 MG capsule, 2 (two) times daily. , Disp: , Rfl:     QUEtiapine (SEROQUEL) 50 MG tablet, , Disp: , Rfl:     tiZANidine (ZANAFLEX) 4 MG tablet, Take 4 mg by mouth every 6 (six) hours as needed., Disp: , Rfl:     vitamin D (VITAMIN D3) 1000 units Tab, Take 50,000 Units by mouth every 7 days. , Disp: , Rfl:       Objective:     Vitals:  Blood pressure 118/78, temperature 97.4 °F (36.3 °C), weight 112.9 kg (248 lb 12.8 oz).    Physical Examination:   GEN: wn/wd male in no apparent distress  SKIN: no rashes, no sclerodactyly, no Raynaud's, no periungual erythema, no digital tip ulcerations, no nailbed pitting  HEAD: no alopecia, no scalp tenderness, no temporal artery tenderness or induration.  EYES: no pallor, no icterus, PERRLA  ENT:  no thrush, no mucosal dryness or ulcerations, adequate oral hygiene & dentition.  NECK: supple x 6, no masses, no thyromegaly, no lymphadenopathy.  CV:   S1 and S2 regular, no murmurs, gallop or rubs  CHEST: Normal respiratory effort;  normal breath sounds/no adventitious sounds. No signs of consolidation.  ABD: non-tender and non-distended; soft; normal bowel sounds; no rebound/guarding or tenderness. No hepatosplenomegaly.  Musculoskeletal:  No evidence of inflammatory arthritis.  No interval change in deformities    EXTREM: no clubbing, cyanosis or edema. normal pulses.  NEURO:  grossly intact; motor/sensory WNL; no tremors  PSYCH:  normal mood, affect and behavior    Labs:   Lab Results    Component Value Date    WBC 5.73 04/08/2020    HGB 16.0 04/08/2020    HCT 46.4 04/08/2020    MCV 86 04/08/2020     04/08/2020   CMP@  Sodium   Date Value Ref Range Status   04/08/2020 139 136 - 145 mmol/L Final     Potassium   Date Value Ref Range Status   04/08/2020 3.9 3.5 - 5.1 mmol/L Final     Chloride   Date Value Ref Range Status   04/08/2020 108 95 - 110 mmol/L Final     CO2   Date Value Ref Range Status   04/08/2020 24 23 - 29 mmol/L Final     Glucose   Date Value Ref Range Status   04/08/2020 106 70 - 110 mg/dL Final     BUN   Date Value Ref Range Status   04/08/2020 16 6 - 20 mg/dL Final     Creatinine   Date Value Ref Range Status   04/08/2020 0.9 0.5 - 1.4 mg/dL Final     Calcium   Date Value Ref Range Status   04/08/2020 9.0 8.7 - 10.5 mg/dL Final     Total Protein   Date Value Ref Range Status   06/02/2020 7.4 6.0 - 8.5 g/dL Final     Albumin   Date Value Ref Range Status   04/08/2020 4.4 3.5 - 5.2 g/dL Final     Total Bilirubin   Date Value Ref Range Status   04/08/2020 0.7 0.1 - 1.0 mg/dL Final     Comment:     For infants and newborns, interpretation of results should be based  on gestational age, weight and in agreement with clinical  observations.  Premature Infant recommended reference ranges:  Up to 24 hours.............<8.0 mg/dL  Up to 48 hours............<12.0 mg/dL  3-5 days..................<15.0 mg/dL  6-29 days.................<15.0 mg/dL       Alkaline Phosphatase   Date Value Ref Range Status   04/08/2020 58 55 - 135 U/L Final     AST   Date Value Ref Range Status   04/08/2020 21 10 - 40 U/L Final     ALT   Date Value Ref Range Status   04/08/2020 19 10 - 44 U/L Final     CPK   Date Value Ref Range Status   06/02/2020 267 (H) 20 - 200 U/L Final     CRP   Date Value Ref Range Status   04/08/2020 0.04 0.00 - 0.75 mg/dL Final     SALVADOR   Date Value Ref Range Status   06/02/2020 Negative  Final     Comment:     Negative   <1:80  Borderline  1:80  Positive   >1:80  Performed at:  [facility]  [facility] [address]  [id number]  : Lacho Gage MD, Phone:  [phone]         Radiology/Diagnostic Studies:    None    Assessment/Discussion/Plan:   35 y.o. male with myofascial pain syndrome/fibromyalgia secondary to underlying PTSD    PLAN:  He will continue with his treatment unchanged.  No blood testing was ordered.    RTC:  I will see him back in 6 months or sooner if needed    Electronically signed by Dilan Baez MD                    Answers for HPI/ROS submitted by the patient on 12/1/2020   fever: No  eye redness: No  mouth sores: No  headaches: Yes  shortness of breath: No  chest pain: No  trouble swallowing: No  diarrhea: No  constipation: Yes  unexpected weight change: No  genital sore: No  During the last 3 days, have you had a skin rash?: No  Bruises or bleeds easily: No  cough: No

## 2020-12-01 NOTE — LETTER
December 1, 2020      [facility]  [address] [facility] [place] [address]           [facility] - Rheumatology  [address]  [address] [place] [id number]  Phone: [phone]  Fax: [phone]          Patient: Primo Yeh   MR Number: [id number]   YOB: 1985   Date of Visit: 12/1/2020       Dear [facility]:    Thank you for referring Primo Yeh to me for evaluation. Attached you will find relevant portions of my assessment and plan of care.    If you have questions, please do not hesitate to call me. I look forward to following Primo Yeh along with you.    Sincerely,    Dilan Baez MD    Enclosure  CC:  No Recipients    If you would like to receive this communication electronically, please contact [email or web address] or [phone] to request more information on [facility] [employer] access.    For providers and/or their staff who would like to refer a patient to [facility], please contact us through our one-stop-shop provider referral line, [facility] , at [phone].    If you feel you have received this communication in error or would no longer like to receive these types of communications, please e-mail [email or web address]

## 2021-01-08 ENCOUNTER — OFFICE VISIT (OUTPATIENT)
Dept: GASTROENTEROLOGY | Facility: CLINIC | Age: 36
End: 2021-01-08
Payer: OTHER GOVERNMENT

## 2021-01-08 VITALS
BODY MASS INDEX: 37.62 KG/M2 | HEART RATE: 97 BPM | DIASTOLIC BLOOD PRESSURE: 86 MMHG | WEIGHT: 254 LBS | HEIGHT: 69 IN | TEMPERATURE: 98 F | SYSTOLIC BLOOD PRESSURE: 133 MMHG

## 2021-01-08 DIAGNOSIS — K59.00 CONSTIPATION, UNSPECIFIED CONSTIPATION TYPE: ICD-10-CM

## 2021-01-08 DIAGNOSIS — K21.9 GASTROESOPHAGEAL REFLUX DISEASE, UNSPECIFIED WHETHER ESOPHAGITIS PRESENT: ICD-10-CM

## 2021-01-08 DIAGNOSIS — K44.9 HIATAL HERNIA: ICD-10-CM

## 2021-01-08 DIAGNOSIS — K58.9 IRRITABLE BOWEL SYNDROME, UNSPECIFIED TYPE: ICD-10-CM

## 2021-01-08 DIAGNOSIS — K64.9 HEMORRHOIDS, UNSPECIFIED HEMORRHOID TYPE: ICD-10-CM

## 2021-01-08 DIAGNOSIS — K62.89 ANAL OR RECTAL PAIN: Primary | ICD-10-CM

## 2021-01-08 PROCEDURE — 99999 PR PBB SHADOW E&M-EST. PATIENT-LVL IV: ICD-10-PCS | Mod: PBBFAC,,, | Performed by: INTERNAL MEDICINE

## 2021-01-08 PROCEDURE — 99214 PR OFFICE/OUTPT VISIT, EST, LEVL IV, 30-39 MIN: ICD-10-PCS | Mod: S$PBB,,, | Performed by: INTERNAL MEDICINE

## 2021-01-08 PROCEDURE — 99214 OFFICE O/P EST MOD 30 MIN: CPT | Mod: S$PBB,,, | Performed by: INTERNAL MEDICINE

## 2021-01-08 PROCEDURE — 99214 OFFICE O/P EST MOD 30 MIN: CPT | Mod: PBBFAC,PN | Performed by: INTERNAL MEDICINE

## 2021-01-08 PROCEDURE — 99999 PR PBB SHADOW E&M-EST. PATIENT-LVL IV: CPT | Mod: PBBFAC,,, | Performed by: INTERNAL MEDICINE

## 2021-01-08 RX ORDER — POLYETHYLENE GLYCOL 3350 17 G/17G
17 POWDER, FOR SOLUTION ORAL
Qty: 1 BOTTLE | Refills: 2 | Status: SHIPPED | OUTPATIENT
Start: 2021-01-08

## 2021-01-13 PROBLEM — K59.00 CONSTIPATION: Status: ACTIVE | Noted: 2021-01-13

## 2021-02-05 ENCOUNTER — TELEPHONE (OUTPATIENT)
Dept: SURGERY | Facility: CLINIC | Age: 36
End: 2021-02-05

## 2021-02-23 ENCOUNTER — OFFICE VISIT (OUTPATIENT)
Dept: SURGERY | Facility: CLINIC | Age: 36
End: 2021-02-23
Payer: OTHER GOVERNMENT

## 2021-02-23 VITALS
WEIGHT: 248.69 LBS | SYSTOLIC BLOOD PRESSURE: 127 MMHG | DIASTOLIC BLOOD PRESSURE: 79 MMHG | BODY MASS INDEX: 36.83 KG/M2 | TEMPERATURE: 98 F | HEART RATE: 76 BPM | HEIGHT: 69 IN

## 2021-02-23 DIAGNOSIS — K64.9 BLEEDING HEMORRHOIDS: Primary | ICD-10-CM

## 2021-02-23 PROCEDURE — 99999 PR PBB SHADOW E&M-EST. PATIENT-LVL IV: ICD-10-PCS | Mod: PBBFAC,,, | Performed by: SURGERY

## 2021-02-23 PROCEDURE — 99214 OFFICE O/P EST MOD 30 MIN: CPT | Mod: PBBFAC,PN | Performed by: SURGERY

## 2021-02-23 PROCEDURE — 99213 OFFICE O/P EST LOW 20 MIN: CPT | Mod: 25,S$PBB,, | Performed by: SURGERY

## 2021-02-23 PROCEDURE — 46600 DIAGNOSTIC ANOSCOPY SPX: CPT | Mod: PBBFAC,PN | Performed by: SURGERY

## 2021-02-23 PROCEDURE — 46600 DIAGNOSTIC ANOSCOPY SPX: CPT | Mod: S$PBB,,, | Performed by: SURGERY

## 2021-02-23 PROCEDURE — 99213 PR OFFICE/OUTPT VISIT, EST, LEVL III, 20-29 MIN: ICD-10-PCS | Mod: 25,S$PBB,, | Performed by: SURGERY

## 2021-02-23 PROCEDURE — 99999 PR PBB SHADOW E&M-EST. PATIENT-LVL IV: CPT | Mod: PBBFAC,,, | Performed by: SURGERY

## 2021-02-23 PROCEDURE — 46600 PR DIAG2STIC A2SCOPY: ICD-10-PCS | Mod: S$PBB,,, | Performed by: SURGERY

## 2021-02-23 RX ORDER — DICLOFENAC SODIUM 10 MG/G
GEL TOPICAL
COMMUNITY
Start: 2020-12-01

## 2021-03-03 ENCOUNTER — TELEPHONE (OUTPATIENT)
Dept: SURGERY | Facility: CLINIC | Age: 36
End: 2021-03-03

## 2021-03-04 DIAGNOSIS — Z01.818 PRE-OP TESTING: ICD-10-CM

## 2021-04-12 ENCOUNTER — LAB VISIT (OUTPATIENT)
Dept: FAMILY MEDICINE | Facility: CLINIC | Age: 36
End: 2021-04-12
Payer: OTHER GOVERNMENT

## 2021-04-12 DIAGNOSIS — Z01.818 PRE-OP TESTING: ICD-10-CM

## 2021-04-12 PROCEDURE — U0005 INFEC AGEN DETEC AMPLI PROBE: HCPCS | Performed by: INTERNAL MEDICINE

## 2021-04-12 PROCEDURE — U0003 INFECTIOUS AGENT DETECTION BY NUCLEIC ACID (DNA OR RNA); SEVERE ACUTE RESPIRATORY SYNDROME CORONAVIRUS 2 (SARS-COV-2) (CORONAVIRUS DISEASE [COVID-19]), AMPLIFIED PROBE TECHNIQUE, MAKING USE OF HIGH THROUGHPUT TECHNOLOGIES AS DESCRIBED BY CMS-2020-01-R: HCPCS | Performed by: INTERNAL MEDICINE

## 2021-04-13 LAB — SARS-COV-2 RNA RESP QL NAA+PROBE: NOT DETECTED

## 2021-04-15 DIAGNOSIS — Z20.822 ENCOUNTER FOR LABORATORY TESTING FOR COVID-19 VIRUS: ICD-10-CM

## 2021-05-02 ENCOUNTER — LAB VISIT (OUTPATIENT)
Dept: FAMILY MEDICINE | Facility: CLINIC | Age: 36
End: 2021-05-02
Payer: OTHER GOVERNMENT

## 2021-05-02 DIAGNOSIS — Z20.822 ENCOUNTER FOR LABORATORY TESTING FOR COVID-19 VIRUS: ICD-10-CM

## 2021-05-02 PROCEDURE — U0005 INFEC AGEN DETEC AMPLI PROBE: HCPCS | Performed by: INTERNAL MEDICINE

## 2021-05-02 PROCEDURE — U0003 INFECTIOUS AGENT DETECTION BY NUCLEIC ACID (DNA OR RNA); SEVERE ACUTE RESPIRATORY SYNDROME CORONAVIRUS 2 (SARS-COV-2) (CORONAVIRUS DISEASE [COVID-19]), AMPLIFIED PROBE TECHNIQUE, MAKING USE OF HIGH THROUGHPUT TECHNOLOGIES AS DESCRIBED BY CMS-2020-01-R: HCPCS | Performed by: INTERNAL MEDICINE

## 2021-05-03 LAB — SARS-COV-2 RNA RESP QL NAA+PROBE: NOT DETECTED

## 2022-10-05 ENCOUNTER — OFFICE VISIT (OUTPATIENT)
Dept: URBAN - METROPOLITAN AREA CLINIC 28 | Facility: CLINIC | Age: 37
End: 2022-10-05
Payer: COMMERCIAL

## 2022-10-05 DIAGNOSIS — H52.13 MYOPIA, BILATERAL: Primary | ICD-10-CM

## 2022-10-05 PROCEDURE — 92310 CONTACT LENS FITTING OU: CPT | Performed by: OPTOMETRIST

## 2022-10-05 PROCEDURE — 92002 INTRM OPH EXAM NEW PATIENT: CPT | Performed by: OPTOMETRIST

## 2022-10-05 ASSESSMENT — INTRAOCULAR PRESSURE
OD: 15
OS: 15

## 2022-10-05 ASSESSMENT — KERATOMETRY
OS: 41.75
OD: 41.38

## 2022-10-05 ASSESSMENT — VISUAL ACUITY
OD: 20/20
OS: 20/20

## 2022-10-05 NOTE — IMPRESSION/PLAN
Impression: Myopia, bilateral: H52.13. Plan: Updated glasses and CL Rx given for full time wear. Educated on replacement schedule and proper care of CLs. Monitor.

## 2023-02-10 ENCOUNTER — OFFICE VISIT (OUTPATIENT)
Dept: PRIMARY CARE CLINIC | Facility: CLINIC | Age: 38
End: 2023-02-10
Payer: MEDICARE

## 2023-02-10 VITALS
DIASTOLIC BLOOD PRESSURE: 80 MMHG | TEMPERATURE: 99 F | WEIGHT: 238 LBS | SYSTOLIC BLOOD PRESSURE: 116 MMHG | HEART RATE: 82 BPM | HEIGHT: 69 IN | BODY MASS INDEX: 35.25 KG/M2 | OXYGEN SATURATION: 98 % | RESPIRATION RATE: 18 BRPM

## 2023-02-10 DIAGNOSIS — M25.559 HIP PAIN: Primary | ICD-10-CM

## 2023-02-10 PROCEDURE — 99212 PR OFFICE/OUTPT VISIT, EST, LEVL II, 10-19 MIN: ICD-10-PCS | Mod: S$GLB,,, | Performed by: INTERNAL MEDICINE

## 2023-02-10 PROCEDURE — 99212 OFFICE O/P EST SF 10 MIN: CPT | Mod: S$GLB,,, | Performed by: INTERNAL MEDICINE

## 2023-02-10 RX ORDER — BENZOYL PEROXIDE 5 G/100G
GEL TOPICAL DAILY
COMMUNITY

## 2023-02-10 RX ORDER — CLINDAMYCIN PHOSPHATE 10 MG/G
GEL TOPICAL 2 TIMES DAILY
COMMUNITY

## 2023-02-10 RX ORDER — HYDROCORTISONE 1 %
CREAM (GRAM) TOPICAL 2 TIMES DAILY
COMMUNITY

## 2023-02-10 RX ORDER — NAPROXEN 375 MG/1
375 TABLET ORAL 2 TIMES DAILY
COMMUNITY

## 2023-02-10 RX ORDER — PREDNISONE 20 MG/1
20 TABLET ORAL DAILY
COMMUNITY

## 2023-02-10 NOTE — PROGRESS NOTES
Subjective:       Patient ID: Primo Yeh is a 37 y.o. male.    Chief Complaint: Follow-up and Hip Pain (Referral to ortho and rehab)      Patient is established already .......... presents today for a f/u visit and for management of the chronic conditions.        Follow-up  Associated symptoms include arthralgias and myalgias. Pertinent negatives include no fever.   Hip Pain   The incident occurred more than 1 week ago. There was no injury mechanism. The pain is present in the left hip and right hip. The quality of the pain is described as aching. The pain is at a severity of 3/10. The pain is mild. The pain has been Fluctuating since onset.   Review of Systems   Constitutional:  Negative for activity change, appetite change and fever.   Respiratory: Negative.     Cardiovascular: Negative.    Gastrointestinal: Negative.    Musculoskeletal:  Positive for arthralgias, leg pain and myalgias.       Objective:      Physical Exam  Vitals and nursing note reviewed.   Constitutional:       Appearance: Normal appearance.      Comments: Uses a walker for ambulation   Cardiovascular:      Rate and Rhythm: Normal rate and regular rhythm.      Pulses: Normal pulses.      Heart sounds: Normal heart sounds. No murmur heard.    No friction rub. No gallop.   Pulmonary:      Effort: Pulmonary effort is normal.      Breath sounds: Normal breath sounds. No wheezing.   Abdominal:      General: Abdomen is flat. Bowel sounds are normal.      Palpations: Abdomen is soft.   Musculoskeletal:         General: Normal range of motion.   Skin:     General: Skin is warm and dry.   Neurological:      General: No focal deficit present.      Mental Status: He is alert and oriented to person, place, and time.   Psychiatric:         Mood and Affect: Mood normal.       Assessment:       1. Hip pain  Overview:  Rheumatological ROS: stable, mild-to-moderate joint symptoms intermittently, reasonably well controlled by PRN meds.   New concerns - none.    Exam: mild general joint tenderness.   Assessment:  arthralgias improved.   Plan: current treatment plan is effective, no change in therapy.      Assessment & Plan:  I am going to refer him back to his orthopedic doctor and to physical therapy    Orders:  -     Ambulatory referral/consult to Orthopedics; Future; Expected date: 02/17/2023  -     Ambulatory referral/consult to Physical/Occupational Therapy; Future; Expected date: 02/17/2023             Plan:       1. Hip pain  Overview:  Rheumatological ROS: stable, mild-to-moderate joint symptoms intermittently, reasonably well controlled by PRN meds.   New concerns - none.   Exam: mild general joint tenderness.   Assessment:  arthralgias improved.   Plan: current treatment plan is effective, no change in therapy.      Assessment & Plan:  I am going to refer him back to his orthopedic doctor and to physical therapy    Orders:  -     Ambulatory referral/consult to Orthopedics; Future; Expected date: 02/17/2023  -     Ambulatory referral/consult to Physical/Occupational Therapy; Future; Expected date: 02/17/2023               - - -

## 2023-02-14 ENCOUNTER — TELEPHONE (OUTPATIENT)
Dept: FAMILY MEDICINE | Facility: CLINIC | Age: 38
End: 2023-02-14
Payer: OTHER GOVERNMENT

## 2023-02-14 NOTE — TELEPHONE ENCOUNTER
Connor Corbin,  Please review this message below.  Call placed to patient due to message left, spoke with patient states concern AVS statement and Ref[facility] need to [facility] to [facility] and not .  Thank You  Signed:  Shyla Perdomo LPN  ----- Message from Dagmar Burt sent at 2/14/2023 10:13 AM CST -----  Contact: PT  Type: Needs Medical Advice    Who Called: Primo/ PT  Best Call Back Number: [phone]  Additional  Information: Pt asking for the nurse to give him a call back to discuss a referral that was sent  under . Instead of the [facility] also has questions regarding notes in chart from [facility].  Please Advise- Thank you

## 2023-02-15 ENCOUNTER — TELEPHONE (OUTPATIENT)
Dept: PRIMARY CARE CLINIC | Facility: CLINIC | Age: 38
End: 2023-02-15
Payer: OTHER GOVERNMENT

## 2023-02-15 NOTE — TELEPHONE ENCOUNTER
Called patient and advised him that we do not have any VA insurance information in his record, and that I would need his VA authorization/referral number for Dr. Garza before I could submit a VA referral request. Patient states that he will try to obtain the number and call back. Instructed patient to bring all of his insurance cards with him at his next visit so we can update his records.

## 2023-02-15 NOTE — TELEPHONE ENCOUNTER
Call placed to patient due to message left for return call. Currently not available, unable to leave message due to mailbox is not set up.

## 2023-03-09 ENCOUNTER — TELEPHONE (OUTPATIENT)
Dept: PRIMARY CARE CLINIC | Facility: CLINIC | Age: 38
End: 2023-03-09
Payer: OTHER GOVERNMENT

## 2023-03-09 NOTE — TELEPHONE ENCOUNTER
----- Message from Jayleen Jim sent at 3/9/2023  8:27 AM CST -----  Contact: PT  Type:  Needs Medical Advice    Who Called: PT  Symptoms (please be specific): WILL DISCUSS WHEN CALL IS RETURNED    Would the patient rather a call back or a response via [facility]? CALL   Best Call Back Number: [phone] (home)     Additional Information: THANK YOU         [facility] pharmacy calling requesting temporary supply of Hydrochlorothiazide and Topiramate be sent to pharmacy that is teed up. [facility] sent the prescriptions that were approved on 4/22/20 to the pt's incorrect address. Please advise. Thanks.

## 2023-03-10 ENCOUNTER — TELEPHONE (OUTPATIENT)
Dept: PRIMARY CARE CLINIC | Facility: CLINIC | Age: 38
End: 2023-03-10
Payer: OTHER GOVERNMENT

## 2023-03-10 NOTE — TELEPHONE ENCOUNTER
----- Message from Milvia Stevenson sent at 3/9/2023  4:40 PM CST -----  Contact: PT  See encounter.  Questions about referral.  ----- Message -----  From: Jayleen Jim  Sent: 3/9/2023   8:29 AM CST  To: [facility] Staff    Type:  Needs Medical Advice    Who Called: PT  Symptoms (please be specific): WILL DISCUSS WHEN CALL IS RETURNED    Would the patient rather a call back or a response via [facility]? CALL   Best Call Back Number: [phone] (home)     Additional Information: THANK YOU

## 2023-03-10 NOTE — TELEPHONE ENCOUNTER
Returned patient's call, he states that he has VA insurance and his referrals need to go through the [facility]. Advised patient that we did not have his VA information, and that I would call the VA and request they send us his Approved Referral for Medical Care form.  I called the [facility], they said that the patient's current referral for primary care is for [facility], and he will have to obtain a new one for [facility]. They added that this is not usually done until the original referral expires, but he should request an exception because he was seeing Dr. Garza at [facility] and followed him to [facility].   Called patient and notified him of above. Patient verbalized understanding.  Will send PT and Orthopedic Surgery referrals to [facility] when new [facility] primary care referral is received.

## 2023-03-10 NOTE — TELEPHONE ENCOUNTER
----- Message from Pat Estrada sent at 3/10/2023  1:05 PM CST -----  Contact: pt  Type:  Patient Returning Call    Who Called:  pt  Who Left Message for Patient:  monika price  Does the patient know what this is regarding?:  not sure didn't say    Best Call Back Number:  [phone]  Additional Information:

## 2023-03-28 ENCOUNTER — TELEPHONE (OUTPATIENT)
Dept: LAB | Facility: HOSPITAL | Age: 38
End: 2023-03-28

## 2023-03-28 DIAGNOSIS — F17.200 TOBACCO USE DISORDER: Primary | ICD-10-CM

## 2023-03-28 DIAGNOSIS — M25.559 HIP PAIN: ICD-10-CM

## 2023-03-28 NOTE — TELEPHONE ENCOUNTER
----- Message from Vida Conteh sent at 3/28/2023  9:10 AM CDT -----  Contact: Patient  Type:  Needs Medical Advice    Who Called: Patient       Would the patient rather a call back or a response via [facility]? Call    Best Call Back Number: [phone] (home)     Additional Information: Patient needs updated orders for PT in order to start. Please call to advise

## 2023-07-18 ENCOUNTER — TELEPHONE (OUTPATIENT)
Dept: FAMILY MEDICINE | Facility: CLINIC | Age: 38
End: 2023-07-18
Payer: MEDICARE

## 2023-07-18 NOTE — TELEPHONE ENCOUNTER
Call placed to patient due to 's order to tell [facility] to fax us an order. Information given to Sharmin/[facility].    MD Shyla Monaco LPN  Caller: Unspecified (Yesterday,  8:40 AM)  [facility] has to fax us an order               Connor Morris,  Please review message below.  Call placed to [facility] due to message left. States requesting an extension for continual PT treatment due to patient requires more therapy. States patient authorization for physical therapy will end on 7/21/2023.  Requesting orders be faxed to: [facility] @ [phone].  Thank you.  Signed:  Shyla Perdomo LPN    ----- Message from Vida Conteh sent at 7/18/2023  8:29 AM CDT -----  Contact: Aislinn  Type:  Needs Medical Advice    Who Called: [facility]      Would the patient rather a call back or a response via [facility]? Call    Best Call Back Number: [facility] [phone]    Additional Information: Patient needs an extended auth in order to continue PT.       Aislinn also ask that they receive a copy as well to fax number [phone]  ( Auth dept fax )

## 2023-07-31 ENCOUNTER — TELEPHONE (OUTPATIENT)
Dept: FAMILY MEDICINE | Facility: CLINIC | Age: 38
End: 2023-07-31
Payer: MEDICARE

## 2023-07-31 NOTE — TELEPHONE ENCOUNTER
Call placed to [facility] due to Dr. Garza's orders to send updated orders to the clinic to be signed. Currently not available message left for return call.    Connor Morris,  Please review message below.  Call placed to patient due to message left spoke with patient is requesting can PT be extended at [facility] office.  Please review and advise.  Thank you.  Signed:  Shyla Perdomo LPN    ----- Message from Shaquille Cook MA sent at 7/28/2023  7:21 AM CDT -----  Contact: PT  Would you mind following up on this since you were handling it? Thank you!  ----- Message -----  From: Dorothy Dumont MA  Sent: 7/27/2023   3:34 PM CDT  To: Shaquille Cook MA    Good afternoon, please review and advise pt, thank you  ----- Message -----  From: Jayleen Jim  Sent: 7/27/2023   2:36 PM CDT  To: [facility] Staff    Type:  Needs Medical Advice    Who Called: PT   Symptoms (please be specific):  F/U ON PHYSICAL THERAPY  AUTHORIZATION    Would the patient rather a call back or a response via [facility]? CALL   Best Call Back Number: [phone] (home)     Additional Information: THANK YOU

## 2024-07-09 ENCOUNTER — PATIENT MESSAGE (OUTPATIENT)
Dept: ADMINISTRATIVE | Facility: HOSPITAL | Age: 39
End: 2024-07-09
Payer: MEDICARE

## 2025-02-20 ENCOUNTER — OFFICE VISIT (OUTPATIENT)
Dept: URGENT CARE | Facility: CLINIC | Age: 40
End: 2025-02-20
Payer: MEDICARE

## 2025-02-20 VITALS
RESPIRATION RATE: 20 BRPM | OXYGEN SATURATION: 99 % | SYSTOLIC BLOOD PRESSURE: 131 MMHG | BODY MASS INDEX: 37.03 KG/M2 | WEIGHT: 250 LBS | TEMPERATURE: 99 F | HEIGHT: 69 IN | DIASTOLIC BLOOD PRESSURE: 85 MMHG | HEART RATE: 84 BPM

## 2025-02-20 DIAGNOSIS — T78.40XA ALLERGIC REACTION, INITIAL ENCOUNTER: Primary | ICD-10-CM

## 2025-02-20 RX ORDER — ERGOCALCIFEROL 1.25 MG/1
50000 CAPSULE ORAL
COMMUNITY
Start: 2025-02-11

## 2025-02-20 RX ORDER — PREDNISONE 20 MG/1
20 TABLET ORAL DAILY
Qty: 5 TABLET | Refills: 0 | Status: SHIPPED | OUTPATIENT
Start: 2025-02-20 | End: 2025-02-20 | Stop reason: CLARIF

## 2025-02-20 RX ORDER — UREA 1 ML/10ML
LOTION TOPICAL
COMMUNITY
Start: 2024-10-11

## 2025-02-20 RX ORDER — ASPIRIN 81 MG/1
81 TABLET ORAL
COMMUNITY
Start: 2024-08-08

## 2025-02-20 RX ORDER — FAMOTIDINE 20 MG/1
20 TABLET, FILM COATED ORAL
COMMUNITY
Start: 2024-12-06

## 2025-02-20 RX ORDER — TRAMADOL HYDROCHLORIDE 50 MG/1
50 TABLET ORAL EVERY 6 HOURS PRN
COMMUNITY
Start: 2024-12-05

## 2025-02-20 RX ORDER — UREA 200 MG/G
CREAM TOPICAL
COMMUNITY
Start: 2024-10-11

## 2025-02-20 RX ORDER — PREDNISONE 20 MG/1
20 TABLET ORAL DAILY
Qty: 5 TABLET | Refills: 0 | Status: SHIPPED | OUTPATIENT
Start: 2025-02-20 | End: 2025-02-25

## 2025-02-20 RX ORDER — METOPROLOL SUCCINATE 50 MG/1
50 TABLET, EXTENDED RELEASE ORAL
COMMUNITY
Start: 2024-08-08

## 2025-02-20 NOTE — PROGRESS NOTES
"Subjective:       Patient ID: Primo Yeh is a 39 y.o. male.    Vitals:  height is 5' 9" (1.753 m) and weight is 113.4 kg (250 lb). His oral temperature is 98.5 °F (36.9 °C). His blood pressure is 131/85 and his pulse is 84. His respiration is 20 and oxygen saturation is 99%.     Chief Complaint: Allergic Reaction    39-year-old afebrile male who presents today with chief complaint of bumps on the underside of his lips.  He reports that 3 days ago he noticed these bumps.  He explains they do not itch, and are not painful.  He explains that he he has been on [employer] and looking at bumps on lips on the Internet for the past several days.  He explains that he is worried about either a possible allergic type reaction or HPV.  He denies any swelling of the lips, face, or tongue.  He denies any difficulty swallowing.  He denies any dysphonia.  He denies any rash.  He denies any itching.  He denies any nausea, vomiting, shortness of breath, difficulty breathing, dizziness, or chest pain.  He denies any other symptoms or complaints other than previously stated.      Allergic Reaction  This is a new problem. The current episode started 3 to 5 days ago. The problem has been gradually worsening since onset. The patient was exposed to shellfish. Past treatments include nothing. The treatment provided no relief.       Skin:  Positive for lesion.           Objective:      Physical Exam   Constitutional: He is oriented to person, place, and time.  Non-toxic appearance. He does not appear ill. No distress. obesity  HENT:   Head: Normocephalic and atraumatic.   Ears:   Right Ear: Tympanic membrane, external ear and ear canal normal.   Left Ear: Tympanic membrane, external ear and ear canal normal.   Nose: Nose normal.   Mouth/Throat: Mucous membranes are moist. No posterior oropharyngeal erythema. Oropharynx is clear.      Comments: There are several small, approximately 1 mm in size, white bumps on the underside of the lips.  " These appear to be Solomon spots.  Eyes: Conjunctivae are normal. Extraocular movement intact   Neck: Neck supple.   Cardiovascular: Normal rate, regular rhythm, normal heart sounds and normal pulses.   Pulmonary/Chest: Effort normal and breath sounds normal.   Abdominal: Normal appearance.   Musculoskeletal: Normal range of motion.         General: Normal range of motion.   Neurological: no focal deficit. He is alert and oriented to person, place, and time.   Skin: Skin is warm, dry, not diaphoretic and no rash.   Psychiatric: His behavior is normal. Mood normal.   Vitals reviewed.        Past medical history and current medications reviewed.       Assessment:           1. Allergic reaction, initial encounter              Plan:         Allergic reaction, initial encounter  -     predniSONE (DELTASONE) 20 MG tablet; Take 1 tablet (20 mg total) by mouth once daily. for 5 days  Dispense: 5 tablet; Refill: 0  -     Ambulatory referral/consult to Family Practice           INSTRUCTIONS   Medication as prescribed.  Follow up with primary care as scheduled.  To ER for worsening of symptoms, or for any new symptoms as discussed.

## 2025-06-04 ENCOUNTER — OFFICE VISIT (OUTPATIENT)
Dept: URBAN - METROPOLITAN AREA CLINIC 23 | Facility: CLINIC | Age: 40
End: 2025-06-04
Payer: COMMERCIAL

## 2025-06-04 DIAGNOSIS — H20.012 PRIMARY IRIDOCYCLITIS, LEFT EYE: Primary | ICD-10-CM

## 2025-06-04 DIAGNOSIS — H04.123 DRY EYE SYNDROME OF BILATERAL LACRIMAL GLANDS: ICD-10-CM

## 2025-06-04 PROCEDURE — 99203 OFFICE O/P NEW LOW 30 MIN: CPT

## 2025-06-04 RX ORDER — PREDNISOLONE ACETATE 10 MG/ML
1 % SUSPENSION/ DROPS OPHTHALMIC
Qty: 5 | Refills: 1 | Status: ACTIVE
Start: 2025-06-04

## 2025-06-04 ASSESSMENT — INTRAOCULAR PRESSURE
OD: 18
OS: 16

## 2025-06-09 ENCOUNTER — OFFICE VISIT (OUTPATIENT)
Dept: URBAN - METROPOLITAN AREA CLINIC 23 | Facility: CLINIC | Age: 40
End: 2025-06-09
Payer: COMMERCIAL

## 2025-06-09 DIAGNOSIS — H04.123 DRY EYE SYNDROME OF BILATERAL LACRIMAL GLANDS: ICD-10-CM

## 2025-06-09 DIAGNOSIS — H20.011 PRIMARY IRIDOCYCLITIS, RIGHT EYE: Primary | ICD-10-CM

## 2025-06-09 PROCEDURE — 99213 OFFICE O/P EST LOW 20 MIN: CPT

## 2025-06-09 ASSESSMENT — INTRAOCULAR PRESSURE
OS: 13
OD: 13

## 2025-06-16 ENCOUNTER — OFFICE VISIT (OUTPATIENT)
Dept: URBAN - METROPOLITAN AREA CLINIC 23 | Facility: CLINIC | Age: 40
End: 2025-06-16
Payer: COMMERCIAL

## 2025-06-16 DIAGNOSIS — H20.011 PRIMARY IRIDOCYCLITIS, RIGHT EYE: Primary | ICD-10-CM

## 2025-06-16 PROCEDURE — 99214 OFFICE O/P EST MOD 30 MIN: CPT | Performed by: OPTOMETRIST

## 2025-06-16 RX ORDER — ATROPINE SULFATE 10 MG/ML
1 % SOLUTION OPHTHALMIC
Qty: 5 | Refills: 0 | Status: ACTIVE
Start: 2025-06-16

## 2025-06-16 RX ORDER — DIFLUPREDNATE 0.5 MG/ML
0.05 % EMULSION OPHTHALMIC
Qty: 10 | Refills: 0 | Status: ACTIVE
Start: 2025-06-16

## 2025-06-16 ASSESSMENT — INTRAOCULAR PRESSURE
OS: 14
OD: 12

## 2025-06-16 ASSESSMENT — KERATOMETRY
OS: 41.88
OD: 41.88

## 2025-06-20 ENCOUNTER — OFFICE VISIT (OUTPATIENT)
Dept: URBAN - METROPOLITAN AREA CLINIC 23 | Facility: CLINIC | Age: 40
End: 2025-06-20
Payer: COMMERCIAL

## 2025-06-20 PROCEDURE — 99213 OFFICE O/P EST LOW 20 MIN: CPT | Performed by: OPTOMETRIST

## 2025-06-20 ASSESSMENT — INTRAOCULAR PRESSURE
OD: 11
OS: 15

## 2025-06-27 ENCOUNTER — OFFICE VISIT (OUTPATIENT)
Dept: URBAN - METROPOLITAN AREA CLINIC 23 | Facility: CLINIC | Age: 40
End: 2025-06-27
Payer: COMMERCIAL

## 2025-06-27 DIAGNOSIS — H20.011 PRIMARY IRIDOCYCLITIS, RIGHT EYE: Primary | ICD-10-CM

## 2025-06-27 PROCEDURE — 99213 OFFICE O/P EST LOW 20 MIN: CPT | Performed by: OPTOMETRIST

## 2025-06-27 ASSESSMENT — INTRAOCULAR PRESSURE
OD: 11
OS: 13

## 2025-07-18 ENCOUNTER — OFFICE VISIT (OUTPATIENT)
Dept: URBAN - METROPOLITAN AREA CLINIC 23 | Facility: CLINIC | Age: 40
End: 2025-07-18
Payer: COMMERCIAL

## 2025-07-18 DIAGNOSIS — H20.011 PRIMARY IRIDOCYCLITIS, RIGHT EYE: Primary | ICD-10-CM

## 2025-07-18 PROCEDURE — 99213 OFFICE O/P EST LOW 20 MIN: CPT | Performed by: OPTOMETRIST
